# Patient Record
Sex: FEMALE | Race: WHITE | NOT HISPANIC OR LATINO | Employment: FULL TIME | URBAN - METROPOLITAN AREA
[De-identification: names, ages, dates, MRNs, and addresses within clinical notes are randomized per-mention and may not be internally consistent; named-entity substitution may affect disease eponyms.]

---

## 2023-08-09 ENCOUNTER — HOSPITAL ENCOUNTER (OUTPATIENT)
Dept: DATA CONVERSION | Facility: HOSPITAL | Age: 58
End: 2023-08-09
Attending: OBSTETRICS & GYNECOLOGY | Admitting: OBSTETRICS & GYNECOLOGY
Payer: COMMERCIAL

## 2023-08-09 DIAGNOSIS — N84.0 POLYP OF CORPUS UTERI: ICD-10-CM

## 2023-08-09 DIAGNOSIS — N95.0 POSTMENOPAUSAL BLEEDING: ICD-10-CM

## 2023-08-09 DIAGNOSIS — R93.89 ABNORMAL FINDINGS ON DIAGNOSTIC IMAGING OF OTHER SPECIFIED BODY STRUCTURES: ICD-10-CM

## 2023-08-15 LAB
COMPLETE PATHOLOGY REPORT: NORMAL
CONVERTED CLINICAL DIAGNOSIS-HISTORY: NORMAL
CONVERTED FINAL DIAGNOSIS: NORMAL
CONVERTED FINAL REPORT PDF LINK TO COPY AND PASTE: NORMAL
CONVERTED GROSS DESCRIPTION: NORMAL

## 2023-09-12 ENCOUNTER — OFFICE VISIT (OUTPATIENT)
Dept: PRIMARY CARE | Facility: CLINIC | Age: 58
End: 2023-09-12
Payer: COMMERCIAL

## 2023-09-12 VITALS
BODY MASS INDEX: 28 KG/M2 | DIASTOLIC BLOOD PRESSURE: 98 MMHG | SYSTOLIC BLOOD PRESSURE: 133 MMHG | WEIGHT: 163 LBS | RESPIRATION RATE: 12 BRPM | HEART RATE: 66 BPM

## 2023-09-12 DIAGNOSIS — E78.2 MIXED HYPERLIPIDEMIA: ICD-10-CM

## 2023-09-12 DIAGNOSIS — G62.9 NEUROPATHY: ICD-10-CM

## 2023-09-12 DIAGNOSIS — I10 BENIGN ESSENTIAL HYPERTENSION: Primary | ICD-10-CM

## 2023-09-12 DIAGNOSIS — K21.9 GASTROESOPHAGEAL REFLUX DISEASE WITHOUT ESOPHAGITIS: ICD-10-CM

## 2023-09-12 PROCEDURE — 3080F DIAST BP >= 90 MM HG: CPT | Performed by: INTERNAL MEDICINE

## 2023-09-12 PROCEDURE — 99214 OFFICE O/P EST MOD 30 MIN: CPT | Performed by: INTERNAL MEDICINE

## 2023-09-12 PROCEDURE — 3075F SYST BP GE 130 - 139MM HG: CPT | Performed by: INTERNAL MEDICINE

## 2023-09-12 RX ORDER — METOPROLOL SUCCINATE 50 MG/1
50 TABLET, EXTENDED RELEASE ORAL DAILY
COMMUNITY
End: 2023-09-12 | Stop reason: SDUPTHER

## 2023-09-12 RX ORDER — HYDROCHLOROTHIAZIDE 25 MG/1
25 TABLET ORAL DAILY
Qty: 90 TABLET | Refills: 1 | Status: SHIPPED | OUTPATIENT
Start: 2023-09-12 | End: 2024-03-02

## 2023-09-12 RX ORDER — METOPROLOL SUCCINATE 50 MG/1
50 TABLET, EXTENDED RELEASE ORAL DAILY
Qty: 90 TABLET | Refills: 1 | Status: SHIPPED | OUTPATIENT
Start: 2023-09-12 | End: 2024-02-15

## 2023-09-12 NOTE — PROGRESS NOTES
Subjective   Patient ID: Julieta Lagunas is a 57 y.o. female who presents for No chief complaint on file..    HPI follow-up visit no chest pain no shortness of breath no palpitations weight has been struggling with has been watching diet trying to exercise walks dog some stressors with her mother's recent hospitalization diastolic blood pressures have been elevated neuropathy discomfort is okay    Past medical history hypertension breast cancer hyperlipidemia gerd    Medication Neurontin 600 mg p.o. nightly metoprolol Vesicare tamoxifen    Allergies no known drug allergies    Social history no tobacco    Family history mother early Alzheimer's disease some arrhythmia has been 2 weeks in [facility]    Prevention walks dogs 4 miles per day [employer] some prior blood work reviewed           vital signs noted alert and oriented x3 NCAT    Review of Systems    Objective   There were no vitals taken for this visit.    Physical Exam color is good no lid lag no JVD no thyromegaly chest clear to auscultation CV regular rate and rhythm S1-S2 without murmur gallop rub or skip extremities no clubbing cyanosis or edema normal distal pulses    Assessment/Plan impression hypertension other diagnoses neuropathy weight diagnosis GERD hyperlipidemia carbohydrate diagnosis  Plan we will add hydrochlorothiazide 25 mg 1 p.o. daily fluid management refill metoprolol see EMR diet low in salt avoid nsaids ok for ppi or h2 blocker continue with regular exercise overall good water consumption overall weight loss inquired about other medications for weight loss we will hold on that at this time given that she is on tamoxifen and Neurontin which may be a portion of her weight then will recheck in 2 months for weight blood pressure and blood work for lipids and metabolism follow-up sooner as needed  tt40 cc21

## 2023-09-30 NOTE — H&P
History of Present Illness:   Pregnant/Lactating:  ·  Are You Pregnant no   ·  Are You Currently Breastfeeding no     History Present Illness:  Reason for surgery: PMB   HPI:    Here for hysteroscopy for PMB.      Allergies:        Allergies:  ·  Adhesive Bandages MISC : Unknown, [facility], [place]  ·  Tape  - Adhesive, Bandaids, Paper: Unknown    Home Medication Review:   Home Medications Reviewed: yes     Impression/Procedure:   ·  Impression and Planned Procedure: Hysteroscopy polypectomy       ERAS (Enhanced Recovery After Surgery):  ·  ERAS Patient: no     Review of Systems:   Review of Systems:  Constitutional: NEGATIVE: Fever, Chills, Anorexia,  Weight Loss, Malaise     Eyes: NEGATIVE: Blurry Vision, Drainage, Diploplia,  Redness, Vision Loss/ Change     ENMT: NEGATIVE: Nasal Discharge, Nasal Congestion,  Ear Pain, Mouth Pain, Throat Pain     Respiratory: NEGATIVE: Dry Cough, Productive Cough,  Hemoptysis, Wheezing, Shortness of Breath     Cardiac: NEGATIVE: Chest Pain, Dyspnea on Exertion,  Orthopnea, Palpitations, Syncope     Gastrointestinal: NEGATIVE: Nausea, Vomiting, Diarrhea,  Constipation, Abdominal Pain     Genitourinary: NEGATIVE: Discharge, Dysuria, Flank  Pain, Frequency, Hematuria     Musculoskeletal: NEGATIVE: Decreased ROM, Pain,  Swelling, Stiffness, Weakness     Neurological: NEGATIVE: Dizziness, Confusion, Headache,  Seizures, Syncope     Psychiatric: NEGATIVE: Mood Changes, Anxiety, Hallucinations,  Sleep Changes, Suicidal Ideas     Skin: NEGATIVE: Mass, Pain, Pruritus, Rash, Ulcer     Endocrine: NEGATIVE: Heat Intolerance, Cold Intolerance,  Sweat, Polyuria, Thirst     Hematologic/Lymph: NEGATIVE: Anemia, Bruising,  Easy Bleeding, Night Sweats, Petechiae     Allergic/Immunologic: NEGATIVE: Anaphylaxis, Itchy/  Teary Eyes, Itching, Sneezing, Swelling     Breast: NEGATIVE: Pain, Mass, Discharge, Nipple  Itching, Gynecomastia         Physical Exam by System:    Constitutional: Well developed,  awake/alert/oriented  x3, no distress, alert and cooperative   Respiratory/Thorax: Normal respiratory effort   Cardiovascular: RRR   Psychological: Appropriate mood and behavior     Consent:   COVID-19 Consent:  ·  COVID-19 Risk Consent Surgeon has reviewed key risks related to the risk of toño COVID-19 and if they contract COVID-19 what the risks are.       Electronic Signatures:  Bazella, Corinne (MD)  (Signed 06-Aug-2023 19:28)   Authored: History of Present Illness, Allergies, Home  Medication Review, Impression/Procedure, ERAS, Review of Systems, Physical Exam, Consent, Note Completion      Last Updated: 06-Aug-2023 19:28 by Bazella, Corinne (MD)

## 2023-10-01 NOTE — OP NOTE
Post Operative Note:     PreOp Diagnosis: Uterine Polyp   Post-Procedure Diagnosis: same   Procedure: 1. Hysteroscopy polypectomy, sampling  of endometrial tissue   Surgeon: Dave   Resident/Fellow/Other Assistant: none   Anesthesia: MAC   I.V. Fluids: 500   Estimated Blood Loss (mL): 5   Specimen: yes   Findings: large intrauterine polyp     Operative Report Dictated:  Dictation: not applicable - note contains Operative  Report   Operative Report:    The patient was taken to the operating room where anesthesia was administered. She was then positioned in the dorsal lithotomy position, and a bimanual exam was performed.  The patient was then prepped and draped in the normal sterile fashion. Next, a speculum was placed into the vagina, and the anterior lip of the cervix was grasped with a single-tooth tenaculum.  The cervix was dilated and the hysteroscope was then inserted  through the cervical canal and the interior of the uterus was examined. The fundus and bilateral ostia were seen as well as the polyp. The resection device was used and the polyp was removed using this device, a sample of the endometrium throughout the  uterus was also sampled using the device.  The hysteroscope was removed.    After the procedure, the single-toothed tenaculum was removed. The cervix and vagina were then examined and found to be hemostatic. The speculum was removed. All counts were correct.  The specimen was sent to pathology.      The patient was taken from the operating room in stable condition after reversal of anesthesia. She discharged home on the day of surgery.      Attestation:   Note Completion:  Attending Attestation I performed the procedure without a resident         Electronic Signatures:  Bazella, Corinne (MD)  (Signed 09-Aug-2023 12:56)   Authored: Post Operative Note, Note Completion      Last Updated: 09-Aug-2023 12:56 by Bazella, Corinne (MD)

## 2023-10-31 ENCOUNTER — PHARMACY VISIT (OUTPATIENT)
Dept: PHARMACY | Facility: CLINIC | Age: 58
End: 2023-10-31
Payer: COMMERCIAL

## 2023-10-31 PROCEDURE — RXMED WILLOW AMBULATORY MEDICATION CHARGE

## 2023-10-31 RX ORDER — GABAPENTIN 300 MG/1
300 CAPSULE ORAL 2 TIMES DAILY
Qty: 720 CAPSULE | Refills: 0 | Status: CANCELLED | OUTPATIENT
Start: 2023-10-31 | End: 2024-10-29

## 2023-11-17 ENCOUNTER — OFFICE VISIT (OUTPATIENT)
Dept: PRIMARY CARE | Facility: CLINIC | Age: 58
End: 2023-11-17
Payer: COMMERCIAL

## 2023-11-17 VITALS — BODY MASS INDEX: 28.15 KG/M2 | WEIGHT: 164 LBS | DIASTOLIC BLOOD PRESSURE: 68 MMHG | SYSTOLIC BLOOD PRESSURE: 121 MMHG

## 2023-11-17 DIAGNOSIS — E78.2 MIXED HYPERLIPIDEMIA: ICD-10-CM

## 2023-11-17 DIAGNOSIS — G47.9 SLEEP DISTURBANCE: ICD-10-CM

## 2023-11-17 DIAGNOSIS — G47.10 HYPERSOMNIA, UNSPECIFIED: ICD-10-CM

## 2023-11-17 DIAGNOSIS — I10 BENIGN ESSENTIAL HYPERTENSION: ICD-10-CM

## 2023-11-17 DIAGNOSIS — R06.83 SNORING: ICD-10-CM

## 2023-11-17 DIAGNOSIS — Z00.00 HEALTH CARE MAINTENANCE: Primary | ICD-10-CM

## 2023-11-17 PROCEDURE — 99213 OFFICE O/P EST LOW 20 MIN: CPT | Performed by: INTERNAL MEDICINE

## 2023-11-17 PROCEDURE — 3074F SYST BP LT 130 MM HG: CPT | Performed by: INTERNAL MEDICINE

## 2023-11-17 PROCEDURE — 3078F DIAST BP <80 MM HG: CPT | Performed by: INTERNAL MEDICINE

## 2023-11-17 NOTE — PROGRESS NOTES
Subjective   Patient ID: Julieta Lagunas is a 58 y.o. female who presents for No chief complaint on file..    HPI follow-up visit no chest pain no shortness of breath no side effect with medication seems to be doing better overall but still with low energy metabolism    Review of Systems    Objective   There were no vitals taken for this visit.    Physical Exam vital signs noted alert and oriented x3 NCAT OP benign no JVD chest clear to auscultation CV regular rate and rhythm S1-S2 extremities no clubbing cyanosis or edema normal distal pulses    Assessment/Plan impression hypertension rule out sleep apnea hyperlipidemia  Plan would like coronary artery calcium CT score requisition made and advised on future lipids and medication  Would like home sleep study risk-benefit side effects reviewed with her and wishes to proceed given insufficient sleep  Continue with blood pressure medicine appears to be helpful  Diet exercise weight loss  Review prior laboratory results (lipids modestly elevated)  Recheck after testing

## 2023-11-21 ENCOUNTER — PHARMACY VISIT (OUTPATIENT)
Dept: PHARMACY | Facility: CLINIC | Age: 58
End: 2023-11-21
Payer: COMMERCIAL

## 2024-01-16 PROCEDURE — RXMED WILLOW AMBULATORY MEDICATION CHARGE

## 2024-01-17 ENCOUNTER — PHARMACY VISIT (OUTPATIENT)
Dept: PHARMACY | Facility: CLINIC | Age: 59
End: 2024-01-17
Payer: COMMERCIAL

## 2024-01-29 PROCEDURE — RXMED WILLOW AMBULATORY MEDICATION CHARGE

## 2024-01-31 ENCOUNTER — PHARMACY VISIT (OUTPATIENT)
Dept: PHARMACY | Facility: CLINIC | Age: 59
End: 2024-01-31
Payer: COMMERCIAL

## 2024-02-13 ENCOUNTER — APPOINTMENT (OUTPATIENT)
Dept: DERMATOLOGY | Facility: CLINIC | Age: 59
End: 2024-02-13
Payer: COMMERCIAL

## 2024-02-14 DIAGNOSIS — I10 BENIGN ESSENTIAL HYPERTENSION: ICD-10-CM

## 2024-02-15 RX ORDER — METOPROLOL SUCCINATE 50 MG/1
50 TABLET, EXTENDED RELEASE ORAL DAILY
Qty: 90 TABLET | Refills: 1 | Status: SHIPPED | OUTPATIENT
Start: 2024-02-15

## 2024-02-28 DIAGNOSIS — I10 BENIGN ESSENTIAL HYPERTENSION: ICD-10-CM

## 2024-03-02 RX ORDER — HYDROCHLOROTHIAZIDE 25 MG/1
25 TABLET ORAL DAILY
Qty: 90 TABLET | Refills: 1 | Status: SHIPPED | OUTPATIENT
Start: 2024-03-02

## 2024-03-25 ENCOUNTER — CLINICAL SUPPORT (OUTPATIENT)
Dept: SLEEP MEDICINE | Facility: HOSPITAL | Age: 59
End: 2024-03-25
Payer: COMMERCIAL

## 2024-03-25 DIAGNOSIS — G47.10 HYPERSOMNIA, UNSPECIFIED: ICD-10-CM

## 2024-03-25 DIAGNOSIS — R06.83 SNORING: ICD-10-CM

## 2024-03-25 DIAGNOSIS — G47.9 SLEEP DISTURBANCE: ICD-10-CM

## 2024-03-25 PROCEDURE — 95806 SLEEP STUDY UNATT&RESP EFFT: CPT | Performed by: INTERNAL MEDICINE

## 2024-04-09 PROCEDURE — RXMED WILLOW AMBULATORY MEDICATION CHARGE

## 2024-04-10 ENCOUNTER — PHARMACY VISIT (OUTPATIENT)
Dept: PHARMACY | Facility: CLINIC | Age: 59
End: 2024-04-10
Payer: COMMERCIAL

## 2024-04-25 ENCOUNTER — APPOINTMENT (OUTPATIENT)
Dept: SLEEP MEDICINE | Facility: HOSPITAL | Age: 59
End: 2024-04-25
Payer: COMMERCIAL

## 2024-04-25 NOTE — PROGRESS NOTES
Subjective   Patient ID: Julieta Lagunas is a 58 y.o. female who presents for No chief complaint on file..  [facility]  58 y.o.  patient presenting as a referral from Dr. Tamayo with complaints of urge urinary incontinence    Problems on going for the last 2 years. Her biggest complaint today is that urgency and frequency that worsens over the course of the day.  She notes urinary incontinence and significant daytime frequency.  She notes 2 episodes of nocturia.  She denies a history of nephrolithiasis, chronic urinary tract infections, or any gross hematuria.  She does note nightly enuresis.    She has been evaluated previously and prescribed solifenacin that did not work. She explains that it does not seem to matter what she drinks. She notices that when she starts drinking water in the afternoon she cannot make it to the bathroom. In the evenings she notices that she will go 3-4 times between 6-11pm. She notes 2x nocturia, and does have enuresis. She changes her pad twice during the day.     She otherwise denies any vaginal complaints.  She is sexually active and denies any vaginal dryness, prolapse concerns, or any abnormal discharge.    Hx breast cancer, bilateral mastectomy currently on medication  Previous smoker, now vapes.     She denies any bowel related complaints.    Review of Systems  Constitutional: No fever, No chills and No fatigue.   Eyes: No vision problems and No dryness of the eyes.   ENT: No dry mouth, No hearing loss and No nosebleeds.   Cardiovascular: No chest pain, No palpitations and No orthopnea.   Respiratory: No shortness of breath, No cough and No wheezing.   Gastrointestinal: No abdominal pain, No constipation, No nausea, No diarrhea, No vomiting and No melena.   Genitourinary: As noted in HPI.   Musculoskeletal: No back pain, No myalgias, No muscle weakness, No joint swelling and No leg edema.   Integumentary: No rashes, No skin lesion and No itching.   Neurological: No headache, No numbness  and No dizziness.   Psychiatric: No sleep disturbances, No anxiety and No depression.   Endocrine: No hot flashes, No loss of hair and No hirsutism.   Hematologic/Lymphatic: No swollen glands, No tendency for easy bleeding and No tendency for easy bruising.   All other systems have been reviewed and are negative for complaint.        Objective   Physical Exam  PHYSICAL EXAMINATION:  No LMP recorded.  There is no height or weight on file to calculate BMI.  There were no vitals taken for this visit.  General Appearance: well appearing  Neuro: Alert and oriented   HEENT: mucous membranes moist, neck supple  Resp: No respiratory distress, normal work of breathing  MSK: normal range of motion, gait appropriate    Assessment/Plan     58-year-old with urge urinary incontinence and history of breast cancer.    We discussed beta-3 agonist medications today as she did not have benefits from Solifenacin previously. We discussed the risks and benefits. We also talked about the fact that this can take 3-4 works to begin working. We sent her Myrbetriq prescription to her listed pharmacy.    We discussed botox vs sacral neuromodulation: both have similar efficacy 80% patients reports >50% improvement, botox associated with 5% risk of incomplete emptying, increase in UTI and will require re-injection in 6-9 months; and as early as 3 months. SNM is a staged procedure, 2 weeks apart, consisting first of lead implantation then internalization of IPG if there is improvement. Interstim is associated with lead migration, explantation, infection and bleeding, though risks are all <5%. We also discussed PTNS which is associated with success rates comparable to medical therapy but without side-effects without significant major morbidity.  We also discussed the Revi system including its risks and benefits.  The patient would prefer to exhaust all medication options before considering any further procedures.     The patient will follow up in  4 weeks to discuss her lower urinary tract symptoms.     JAYASHREE Garrett MD    Scribe Attestation  By signing my name below, I, Charley Abena Han attest that this documentation has been prepared under the direction and in the presence of Dominick Garrett MD. All medical record entries made by the Scribe were at my direction or personally dictated by me. I have reviewed the chart and agree that the record accurately reflects my personal performance of the history, physical exam, discussion and plan.

## 2024-04-29 PROCEDURE — RXMED WILLOW AMBULATORY MEDICATION CHARGE

## 2024-05-02 ENCOUNTER — PHARMACY VISIT (OUTPATIENT)
Dept: PHARMACY | Facility: CLINIC | Age: 59
End: 2024-05-02
Payer: COMMERCIAL

## 2024-05-03 ENCOUNTER — OFFICE VISIT (OUTPATIENT)
Dept: UROLOGY | Facility: CLINIC | Age: 59
End: 2024-05-03
Payer: COMMERCIAL

## 2024-05-03 VITALS — SYSTOLIC BLOOD PRESSURE: 118 MMHG | HEART RATE: 64 BPM | DIASTOLIC BLOOD PRESSURE: 78 MMHG

## 2024-05-03 DIAGNOSIS — R35.0 FREQUENT URINATION: ICD-10-CM

## 2024-05-03 DIAGNOSIS — N39.41 URGE INCONTINENCE: Primary | ICD-10-CM

## 2024-05-03 DIAGNOSIS — F17.200 SMOKER: ICD-10-CM

## 2024-05-03 LAB
POC APPEARANCE, URINE: ABNORMAL
POC BILIRUBIN, URINE: NEGATIVE
POC BLOOD, URINE: ABNORMAL
POC COLOR, URINE: YELLOW
POC GLUCOSE, URINE: NEGATIVE MG/DL
POC KETONES, URINE: NEGATIVE MG/DL
POC LEUKOCYTES, URINE: ABNORMAL
POC NITRITE,URINE: NEGATIVE
POC PH, URINE: 6 PH
POC PROTEIN, URINE: ABNORMAL MG/DL
POC SPECIFIC GRAVITY, URINE: 1.02
POC UROBILINOGEN, URINE: 0.2 EU/DL

## 2024-05-03 PROCEDURE — 99214 OFFICE O/P EST MOD 30 MIN: CPT | Performed by: OBSTETRICS & GYNECOLOGY

## 2024-05-03 PROCEDURE — 81003 URINALYSIS AUTO W/O SCOPE: CPT | Performed by: OBSTETRICS & GYNECOLOGY

## 2024-05-03 PROCEDURE — 99204 OFFICE O/P NEW MOD 45 MIN: CPT | Performed by: OBSTETRICS & GYNECOLOGY

## 2024-05-03 RX ORDER — MIRABEGRON 50 MG/1
50 TABLET, EXTENDED RELEASE ORAL DAILY
Qty: 30 TABLET | Refills: 11 | Status: SHIPPED | OUTPATIENT
Start: 2024-05-03 | End: 2024-05-09

## 2024-05-03 NOTE — PATIENT INSTRUCTIONS
Please start your Myrbetriq medication for your bladder at your earliest convenience.  Should this be cost prohibitive or you have any issues with this please stop it immediately and contact the clinic.    Please follow-up in 4 to 6 weeks virtually to discuss her lower urinary tract complaints.    [phone]

## 2024-05-09 DIAGNOSIS — N39.41 URGE INCONTINENCE: Primary | ICD-10-CM

## 2024-05-09 DIAGNOSIS — N39.41 URGE INCONTINENCE: ICD-10-CM

## 2024-05-09 RX ORDER — TOLTERODINE 2 MG/1
2 CAPSULE, EXTENDED RELEASE ORAL DAILY
Qty: 30 CAPSULE | Refills: 11 | Status: SHIPPED | OUTPATIENT
Start: 2024-05-09 | End: 2024-05-10

## 2024-05-10 RX ORDER — TRIAMCINOLONE ACETONIDE 1 MG/G
CREAM TOPICAL
COMMUNITY
Start: 2019-05-07

## 2024-05-10 RX ORDER — LIDOCAINE HYDROCHLORIDE 20 MG/ML
SOLUTION OROPHARYNGEAL
COMMUNITY
Start: 2021-10-11

## 2024-05-10 RX ORDER — RAMIPRIL 1.25 MG/1
1.25 CAPSULE ORAL
COMMUNITY

## 2024-05-10 RX ORDER — LOSARTAN POTASSIUM 25 MG/1
TABLET ORAL
COMMUNITY
Start: 2019-10-23

## 2024-05-10 RX ORDER — LETROZOLE 2.5 MG/1
2.5 TABLET, FILM COATED ORAL
COMMUNITY

## 2024-05-10 RX ORDER — ASPIRIN 81 MG/1
TABLET ORAL
COMMUNITY
Start: 2019-06-28

## 2024-05-10 RX ORDER — MULTIVITAMIN WITH IRON
TABLET ORAL
COMMUNITY

## 2024-05-10 RX ORDER — ZOLPIDEM TARTRATE 10 MG/1
TABLET ORAL
COMMUNITY
Start: 2016-05-01

## 2024-05-10 RX ORDER — TOLTERODINE 2 MG/1
CAPSULE, EXTENDED RELEASE ORAL
Qty: 90 CAPSULE | Refills: 3 | Status: SHIPPED | OUTPATIENT
Start: 2024-05-10

## 2024-05-10 RX ORDER — PSYLLIUM HUSK 0.4 G
CAPSULE ORAL
COMMUNITY
Start: 2014-01-21

## 2024-05-10 RX ORDER — OMEPRAZOLE 20 MG/1
1 CAPSULE, DELAYED RELEASE ORAL 2 TIMES DAILY
COMMUNITY
Start: 2013-05-03

## 2024-05-16 ENCOUNTER — HOSPITAL ENCOUNTER (OUTPATIENT)
Dept: RADIOLOGY | Facility: HOSPITAL | Age: 59
Discharge: HOME | End: 2024-05-16
Payer: COMMERCIAL

## 2024-05-16 DIAGNOSIS — E78.2 MIXED HYPERLIPIDEMIA: ICD-10-CM

## 2024-05-16 DIAGNOSIS — I10 BENIGN ESSENTIAL HYPERTENSION: ICD-10-CM

## 2024-05-16 PROCEDURE — 75571 CT HRT W/O DYE W/CA TEST: CPT

## 2024-06-05 NOTE — PROGRESS NOTES
Subjective   Patient ID: Julieta Lagunas is a 58 y.o. female who presents for follow up.    Today's visit was done virtually after appropriate consent from the patient. Virtual or Telephone Consent     An interactive audio  telecommunication system which permits real time communications between the patient at home and provider (at the distant site) was utilized to provide this telehealth service. Greater than 10 minutes were spent discussing the patients concerns and coordinating care.     HPI  This visit was performed through telemedicine  58-year-old with urge urinary incontinence and history of breast cancer.    The patient appears to be having excellent results with the Detrol. She denies any dry mouth or constipation complaints with this medication. She denies any UTI like symptoms.    She denies any vaginal complaints, no abnormal bleeding or discharge.     She denies any bowel related complaints, no fecal or flatal incontinence.    She has no other complaints.      From Previous note  58 y.o.  patient presenting as a referral from Dr. Tamayo with complaints of urge urinary incontinence    Problems on going for the last 2 years. Her biggest complaint today is that urgency and frequency that worsens over the course of the day.  She notes urinary incontinence and significant daytime frequency.  She notes 2 episodes of nocturia.  She denies a history of nephrolithiasis, chronic urinary tract infections, or any gross hematuria.  She does note nightly enuresis.    She has been evaluated previously and prescribed solifenacin that did not work. She explains that it does not seem to matter what she drinks. She notices that when she starts drinking water in the afternoon she cannot make it to the bathroom. In the evenings she notices that she will go 3-4 times between 6-11pm. She notes 2x nocturia, and does have enuresis. She changes her pad twice during the day.     She otherwise denies any vaginal complaints.  She is  sexually active and denies any vaginal dryness, prolapse concerns, or any abnormal discharge.    Hx breast cancer, bilateral mastectomy currently on medication  Previous smoker, now vapes.     She denies any bowel related complaints.    Review of Systems  Constitutional: No fever, No chills and No fatigue.   Eyes: No vision problems and No dryness of the eyes.   ENT: No dry mouth, No hearing loss and No nosebleeds.   Cardiovascular: No chest pain, No palpitations and No orthopnea.   Respiratory: No shortness of breath, No cough and No wheezing.   Gastrointestinal: No abdominal pain, No constipation, No nausea, No diarrhea, No vomiting and No melena.   Genitourinary: As noted in HPI.   Musculoskeletal: No back pain, No myalgias, No muscle weakness, No joint swelling and No leg edema.   Integumentary: No rashes, No skin lesion and No itching.   Neurological: No headache, No numbness and No dizziness.   Psychiatric: No sleep disturbances, No anxiety and No depression.   Endocrine: No hot flashes, No loss of hair and No hirsutism.   Hematologic/Lymphatic: No swollen glands, No tendency for easy bleeding and No tendency for easy bruising.   All other systems have been reviewed and are negative for complaint.        Objective   Physical Exam  This visit was performed through telemedicine     Assessment/Plan     58-year-old with urge urinary incontinence and history of breast cancer.    We again discussed the patient's urge urinary incontinence complaints.  She has had near complete resolution of these concerns utilizing her tolterodine 2 mg daily.  She wishes to have this represcribed she will [facility] order pharmacy but is not sure what this is.  She will contact the clinic in order to update this information and we will send a 90-day supply with a year of refills.    The patient will follow up yearly moving forward    JAYASHREE Garrett MD    Scribe Attestation  By signing my name below, Charley HENLEY, Abena attest  that this documentation has been prepared under the direction and in the presence of Dominick Garrett MD. All medical record entries made by the Scribe were at my direction or personally dictated by me. I have reviewed the chart and agree that the record accurately reflects my personal performance of the history, physical exam, discussion and plan.

## 2024-06-06 ENCOUNTER — TELEMEDICINE (OUTPATIENT)
Dept: UROLOGY | Facility: CLINIC | Age: 59
End: 2024-06-06
Payer: COMMERCIAL

## 2024-06-06 DIAGNOSIS — R35.0 FREQUENT URINATION: ICD-10-CM

## 2024-06-06 DIAGNOSIS — N39.41 URGE INCONTINENCE: Primary | ICD-10-CM

## 2024-06-06 DIAGNOSIS — F17.200 SMOKER: ICD-10-CM

## 2024-06-06 PROCEDURE — 99442 PR PHYS/QHP TELEPHONE EVALUATION 11-20 MIN: CPT | Performed by: OBSTETRICS & GYNECOLOGY

## 2024-07-08 PROCEDURE — RXMED WILLOW AMBULATORY MEDICATION CHARGE

## 2024-07-11 ENCOUNTER — PHARMACY VISIT (OUTPATIENT)
Dept: PHARMACY | Facility: CLINIC | Age: 59
End: 2024-07-11
Payer: COMMERCIAL

## 2024-07-24 ENCOUNTER — APPOINTMENT (OUTPATIENT)
Dept: PRIMARY CARE | Facility: CLINIC | Age: 59
End: 2024-07-24
Payer: COMMERCIAL

## 2024-07-24 VITALS
DIASTOLIC BLOOD PRESSURE: 74 MMHG | HEART RATE: 69 BPM | SYSTOLIC BLOOD PRESSURE: 123 MMHG | RESPIRATION RATE: 12 BRPM | OXYGEN SATURATION: 99 %

## 2024-07-24 DIAGNOSIS — I10 BENIGN ESSENTIAL HYPERTENSION: Primary | ICD-10-CM

## 2024-07-24 DIAGNOSIS — I47.10 SVT (SUPRAVENTRICULAR TACHYCARDIA) (CMS-HCC): ICD-10-CM

## 2024-07-24 PROCEDURE — 99213 OFFICE O/P EST LOW 20 MIN: CPT | Performed by: INTERNAL MEDICINE

## 2024-07-24 PROCEDURE — 3074F SYST BP LT 130 MM HG: CPT | Performed by: INTERNAL MEDICINE

## 2024-07-24 PROCEDURE — 3078F DIAST BP <80 MM HG: CPT | Performed by: INTERNAL MEDICINE

## 2024-07-24 NOTE — PROGRESS NOTES
Subjective   Patient ID: Julieta Lagunas is a 58 y.o. female who presents for No chief complaint on file..    HPI follow-up visit and sick visit no chest pain no shortness of breath no palpitation but noticed that her heart monitoring watch sometimes showed beats per minute greater than 200 lasting sometimes for 5 minutes she was unaware has had no change in stamina or exercise or medication she vapes not much in the way of caffeine also reviewed her sleep apnea study which was basically negative and her CT coronary score which was low and there was a small nodule there with history of breast cancer we will repeat a dedicated lung scan in the future EXTR medications at night    Review of Systems    Objective   There were no vitals taken for this visit.    Physical Exam vital signs noted alert and oriented x 3 NCAT no JVD or bruit no lid lag no proptosis no thyromegaly chest good auscultation CV regular rate and rhythm S1-S2 without murmur gallop or rub or skip extremities no clubbing cyanosis or edema normal distal pulses DTR 2+ no tremor    Assessment/Plan    impression supraventricular tachycardia?  Hypertension lung nodule?  Other diagnoses  Plan continue with your breast cancer surveillance  Good diet regular exercise good water consumption continue with blood pressure medication and set up chest CT for the fall set up Holter monitor or Zio patch to evaluate heart rhythm then recheck for regular examination and blood work and based on above  Sleep hygiene  Nuclear CT scan and Zio patch

## 2024-07-26 PROCEDURE — RXMED WILLOW AMBULATORY MEDICATION CHARGE

## 2024-07-30 ENCOUNTER — PHARMACY VISIT (OUTPATIENT)
Dept: PHARMACY | Facility: CLINIC | Age: 59
End: 2024-07-30
Payer: COMMERCIAL

## 2024-08-28 ENCOUNTER — APPOINTMENT (OUTPATIENT)
Dept: DERMATOLOGY | Facility: CLINIC | Age: 59
End: 2024-08-28
Payer: COMMERCIAL

## 2024-08-28 DIAGNOSIS — D48.5 NEOPLASM OF UNCERTAIN BEHAVIOR OF SKIN: ICD-10-CM

## 2024-08-28 DIAGNOSIS — L82.1 SEBORRHEIC KERATOSIS: ICD-10-CM

## 2024-08-28 DIAGNOSIS — D22.9 MULTIPLE BENIGN NEVI: Primary | ICD-10-CM

## 2024-08-28 DIAGNOSIS — Z12.83 SCREENING EXAM FOR SKIN CANCER: ICD-10-CM

## 2024-08-28 DIAGNOSIS — L57.8 ACTINIC SKIN DAMAGE: ICD-10-CM

## 2024-08-28 DIAGNOSIS — I10 BENIGN ESSENTIAL HYPERTENSION: ICD-10-CM

## 2024-08-28 DIAGNOSIS — Z92.3 HISTORY OF RADIATION EXPOSURE: ICD-10-CM
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
## 2024-08-28 DIAGNOSIS — L81.4 LENTIGO: ICD-10-CM

## 2024-08-28 PROCEDURE — 11301 SHAVE SKIN LESION 0.6-1.0 CM: CPT | Performed by: DERMATOLOGY

## 2024-08-28 PROCEDURE — 99213 OFFICE O/P EST LOW 20 MIN: CPT | Performed by: DERMATOLOGY

## 2024-08-28 RX ORDER — HYDROCHLOROTHIAZIDE 25 MG/1
25 TABLET ORAL DAILY
Qty: 90 TABLET | Refills: 1 | Status: SHIPPED | OUTPATIENT
Start: 2024-08-28

## 2024-08-28 RX ORDER — CLOBETASOL PROPIONATE 0.5 MG/G
OINTMENT TOPICAL 2 TIMES DAILY
Qty: 30 G | Refills: 1 | Status: SHIPPED | OUTPATIENT
Start: 2024-08-28

## 2024-08-28 RX ORDER — METOPROLOL SUCCINATE 50 MG/1
50 TABLET, EXTENDED RELEASE ORAL DAILY
Qty: 90 TABLET | Refills: 1 | Status: SHIPPED | OUTPATIENT
Start: 2024-08-28

## 2024-08-28 ASSESSMENT — DERMATOLOGY QUALITY OF LIFE (QOL) ASSESSMENT
ARE THERE EXCLUSIONS OR EXCEPTIONS FOR THE QUALITY OF LIFE ASSESSMENT: NO
RATE HOW BOTHERED YOU ARE BY EFFECTS OF YOUR SKIN PROBLEMS ON YOUR ACTIVITIES (EG, GOING OUT, ACCOMPLISHING WHAT YOU WANT, WORK ACTIVITIES OR YOUR RELATIONSHIPS WITH OTHERS): 0 - NEVER BOTHERED
RATE HOW BOTHERED YOU ARE BY SYMPTOMS OF YOUR SKIN PROBLEM (EG, ITCHING, STINGING BURNING, HURTING OR SKIN IRRITATION): 0 - NEVER BOTHERED
DATE THE QUALITY-OF-LIFE ASSESSMENT WAS COMPLETED: 67080
RATE HOW EMOTIONALLY BOTHERED YOU ARE BY YOUR SKIN PROBLEM (FOR EXAMPLE, WORRY, EMBARRASSMENT, FRUSTRATION): 0 - NEVER BOTHERED

## 2024-08-28 ASSESSMENT — ITCH NUMERIC RATING SCALE: HOW SEVERE IS YOUR ITCHING?: 0

## 2024-08-28 ASSESSMENT — DERMATOLOGY PATIENT ASSESSMENT
ARE YOU AN ORGAN TRANSPLANT RECIPIENT: NO
DO YOU USE SUNSCREEN: OCCASIONALLY
ARE YOU TRYING TO GET PREGNANT: NO
ARE YOU ON BIRTH CONTROL: NO
DO YOU HAVE IRREGULAR MENSTRUAL CYCLES: NO
HAVE YOU HAD OR DO YOU HAVE VASCULAR DISEASE: NO
DO YOU USE A TANNING BED: NO
DO YOU HAVE ANY NEW OR CHANGING LESIONS: NO
HAVE YOU HAD OR DO YOU HAVE A STAPH INFECTION: NO

## 2024-08-28 ASSESSMENT — PATIENT GLOBAL ASSESSMENT (PGA): PATIENT GLOBAL ASSESSMENT: PATIENT GLOBAL ASSESSMENT:  1 - CLEAR

## 2024-08-28 NOTE — PROGRESS NOTES
Subjective     Julieta Lagunas is a 58 y.o. female who presents for the following: Skin Check (Rt flank).     Last derm visit 12/21/2020 for Full Skin Exam - no lesions of concern        Review of Systems:  No other skin or systemic complaints other than what is documented elsewhere in the note.    The following portions of the chart were reviewed this encounter and updated as appropriate:  Tobacco  Allergies  Meds  Problems  Med Hx  Surg Hx         Skin Cancer History  No skin cancer on file.      Specialty Problems    None       Objective   Well appearing patient in no apparent distress; mood and affect are within normal limits.    A full examination was performed including scalp, head, eyes, ears, nose, lips, neck, chest, axillae, abdomen, back, buttocks, bilateral upper extremities, bilateral lower extremities, hands, feet, fingers, toes, fingernails, and toenails. All findings within normal limits unless otherwise noted below.    Assessment/Plan   1. Multiple benign nevi  Brown and tan macules and papules with reassuring findings on dermoscopy    -These lesions have benign, reassuring patterns on dermoscopy  -Recommend continued self observation, and to contact the office if any changes in nevi are noticed    2. Lentigo  Tan macules    -Benign appearing on exam  -Reassurance, recommend observation    3. Seborrheic keratosis (2)  Generalized, Right Flank  Stuck on, waxy macule(s)/papule(s)/plaque(s) with comedo-like openings and milia like cysts    Treat one on right flank with liquid nitrogen as a test spot    -Discussed the nature of the diagnosis  -Reassurance, recommend continued observation    4. Actinic skin damage  Background of photodamage with hyper- and hypo-pigmented macules on the skin    5. 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 Screening exam for skin cancer  As  part of a routine Full Skin Exam, a genital examination with the presence of a chaperone was offered. The patient declined  the exam and declined the chaperone. Follows with gyne      Full body skin exam  -No lesions concerning for malignancy on the remainder the skin exam today   - The ugly duckling sign was discussed. Monitor for any skin lesions that are different in color, shape, or size than others on body  -Sun protection was discussed. Recommend SPF 30+, hats with brims, sun protective clothing, and avoiding sun exposure between 10 AM and 2 PM whenever possible  -Recommend regular skin exams or sooner if new or changing lesions       Related Procedures  Follow Up In Dermatology - Established Patient    7. Neoplasm of uncertain behavior of skin  Right Lower Leg - Anterior  0.8 x 0.6 cm pink-brown firm papule with positive dimple sign, causing pain with shaving and bleeding          Shave removal    Lesion length (cm):  0.6  Lesion width (cm):  0.8  Margin per side (cm):  0.1  Lesion diameter (cm):  1  Informed consent: discussed and consent obtained    Timeout: patient name, date of birth, surgical site, and procedure verified    Procedure prep:  Patient was prepped and draped  Anesthesia: the lesion was anesthetized in a standard fashion    Anesthetic:  1% lidocaine w/ epinephrine 1-100,000 local infiltration  Instrument used: DermaBlade    Hemostasis achieved with: aluminum chloride    Outcome: patient tolerated procedure well    Post-procedure details: sterile dressing applied and wound care instructions given    Dressing type: bandage and petrolatum      Staff Communication: Dermatology Local Anesthesia: Lidocaine 0.5% with Epinephrine 1;200,000 - Amount: 3 ml    Specimen 1 - Dermatopathology- DERM LAB  Differential Diagnosis: dermatofibroma  Check Margins Yes/No?:    Comments:    Dermpath Lab: Routine Histopathology (formalin-fixed tissue)    8. Bitten or stung by nonvenomous insect and other nonvenomous  arthropods, initial encounter  Fading pink patch    Exuberant reaction to bee sting    Using OTC hydrocortisone    Rx clobetasol today    clobetasol (Temovate) 0.05 % ointment  Apply topically 2 times a day. For up to 2 weeks to areas of bee sting reaction on arms, legs, body        Follow up 1 year Full Skin Exam or sooner as needed

## 2024-08-30 LAB
LABORATORY COMMENT REPORT: NORMAL
PATH REPORT.FINAL DX SPEC: NORMAL
PATH REPORT.GROSS SPEC: NORMAL
PATH REPORT.MICROSCOPIC SPEC OTHER STN: NORMAL
PATH REPORT.RELEVANT HX SPEC: NORMAL
PATH REPORT.TOTAL CANCER: NORMAL

## 2024-09-15 PROBLEM — Z08 ENCOUNTER FOR FOLLOW-UP SURVEILLANCE OF BREAST CANCER: Status: ACTIVE | Noted: 2024-09-15

## 2024-09-15 PROBLEM — Z85.3 HISTORY OF MALIGNANT NEOPLASM OF BOTH BREASTS: Status: ACTIVE | Noted: 2024-09-15

## 2024-09-15 PROBLEM — Z85.3 ENCOUNTER FOR FOLLOW-UP SURVEILLANCE OF BREAST CANCER: Status: ACTIVE | Noted: 2024-09-15

## 2024-09-15 NOTE — PROGRESS NOTES
+Patient ID: Julieta Lagunas is a 59 y.o. female.  BREAST CANCER DIAGNOSIS:    Breast Cancer         AJCC Edition: 7th, Diagnosis Date: 11-Dec-2012, Stage IA, T1b pN0 M0          Breast Cancer         AJCC Edition: 7th, Diagnosis Date: 30-Oct-2012, Stage IIIA, T3 N1 M0 G2     Treatment Synopsis:    1. Clinical T1b N0 M0, stage IA invasive ductal carcinoma of the right breast, status post neoadjuvant dose-dense AC chemotherapy x4, followed by  Taxol x4. This was followed by a right mastectomy with tissue expander reconstruction (May 2013). She achieved a pathologic complete response to neoadjuvant chemotherapy.   2. Clinical T2 N1 M0 stage IIB invasive ductal carcinoma of the left breast, status post neoadjuvant dose-dense AC chemotherapy x4, followed by Taxol x4, followed by a left mastectomy with sentinel lymph node biopsy (surgery May 2013). There was 2.1 cm  residual tumor with 4 out of 28 positive lymph nodes.   3. Following surgery, she received radiation therapy to the left supraclavicular fossa, left axilla, and left upper chest wall consisting of a dose of 50 Gy completed on August 5, 2013.   4. Started tamoxifen June 2014, but then switched to Lupron (1st shot 2/27/15) and Letrozole (letrozole prescribed 3/2015). Last lupron 1/2020 and completed 5 yrs of therapy.   5. June 2013: Revision of the breast with excision of the mastectomy flap skin necrosis and irrigation of the pocket and closure.   6. Feb 2014: Bilateral second-stage reconstruction with removal of tissue expanders and placement of silicone gel implants, extensive capsulotomy, and reshaping of the pocket.   7. Nov 2014: Removal of left breast implant, excision of ulcerated skin, and closure over drain.   8. Dec. 2014: OPERATION/PROCEDURE: 1. Exploration debridement and excisional fashion of skin with subcutaneous tissue and fascia. 2. Irrigation, placement of drain, and complex closure of 5 cm of incision.   9. 5/1/15: Delayed reconstruction of  the left breast with deep inferior epigastric  flap. 2. Extensive capsulotomy and resection of portion of radiated skin, subcutaneous tissue, and fascia on the left side  2020 transition to  tamoxifen.       Past Medical History: Julieta has a past medical history of Anxiety disorder, unspecified, Cutaneous abscess of left lower limb (2016), Displaced fracture of cuboid bone of right foot, initial encounter for closed fracture (2019), Encounter for antineoplastic chemotherapy, Encounter for gynecological examination (general) (routine) without abnormal findings (2016), Encounter for screening for malignant neoplasm of cervix (2016), Other acute postprocedural pain (2014), Other conditions influencing health status (05/15/2015), Other conditions influencing health status, Personal history of malignant neoplasm of breast, Personal history of malignant neoplasm of breast (10/27/2016), Personal history of other diseases of the digestive system (2017), Personal history of other mental and behavioral disorders (10/23/2019), Radiodermatitis, unspecified (10/05/2020), and Unspecified abdominal hernia without obstruction or gangrene (2015).  Surgical History:  Julieta has a past surgical history that includes Dilation and curettage of uterus (2013); Mastectomy (2013); Other surgical history (2013); Other surgical history (2013); Other surgical history (2014); Other surgical history (2014); Other surgical history (2015); Other surgical history (2014); and Other surgical history (2014).  Social History:  Julieta reports that she has been smoking cigarettes. She has never been exposed to tobacco smoke. She has never used smokeless tobacco.     works at  as manager for patient access  per 2021 visit: youngest son is in college at [employer] and her oldest son is working on his Master's at [employer]       Family  History:  No family history on file.  Family Oncology History:  Cancer-related family history is not on file.    HISTORY OF PRESENT ILLNESS:  Julieta Lagunas is a 59 y.o. female who presents today for Breast cancer treatment follow-up and surveillance.  She reports continued use of tamoxifen. She reports no major side effects to the tamoxifen  and denies any concerns for breast cancer. She denies any more abnormal uterine bleeding.  She reports continued use of Effexor for hot flashes.      She denies any chest pain or breathing issues, no cough or short of breath.      She denies any vision issues, headache issues, dizziness or loss of balance, no falls. She reports some baseline Neuropathy in finger and toes and is on daily gabapentin. She takes a tablet Q am and Qpm.       She denies any new or unexplained bone aches or pains with exception to degenerative changes with legs and hip pains.      She denies any skin lesions or masses, oral sores lesions or infections. She is following with derm annually.      She reports a normal appetite, normal bowels. She reports baseline struggles with bladder spasms and stress incontinence- continues to manage with GYN.      She reports baseline issues with sleep- some insomnia issues. Uses Ambien intermittently. She reports intermittent mild fatigue and will nap on weekends.      She is busy with daily exercise with her dog. She is compliant with daily Vit D, MVI, 500mg calcium daily.     Review of Systems  ROS 14 points performed, See HPI for exceptions    OBJECTIVE:    VS / Pain:  /88   Pulse 68   Temp 36.4 °C (97.5 °F)   Resp 18   Wt 70.8 kg (156 lb 1.4 oz)   SpO2 97%   BMI 26.79 kg/m²   BSA: 1.79 meters squared   Pain Scale: 0  ECO- Fully active, able to carry on all pre-disease performance w/o restriction.             Physical Exam  Constitutional: Well developed, awake/alert/oriented x4, no distress, alert and cooperative  EYES: Sclera clear  ENMT: mucous  membranes moist, no apparent injury, no lesions seen  Head/Neck: Neck supple, no apparent injury, thyroid without mass or tenderness, No JVD, trachea midline, no bruits  Respiratory / Thoracic: Patent airways, clear to all lobes, normal breath sounds with good chest expansion, thorax symmetric.  Cardiovascular: Regular, rate and rhythm, no murmurs, 2+ equal pulses of the extremities, normal auscultated S 1and S 2  GI: Nondistended, soft, non-tender, no rebound tenderness or guarding, no masses palpable, no organomegaly, +BS, no bruits  Musculoskeletal: ROM intact, no joint swelling, normal strength, no spinal tenderness  Extremities: normal extremities, no cyanosis edema, contusions or wounds, no clubbing  Neurological: alert and oriented x4, intact senses, motor, response and reflexes, normal strength  Breast: S/p history of bilateral mastectomies with LEFT FLAP and XRT, Right with silicone implant and reconstruction. No palpable masses or lesion   Lymphatic: No cervical, supraclavicular, infraclavicular or axillary lymphadenopathy  Psychological: Appropriate and talkative mood and behavior  Skin:  Warm and dry, no lesions, no rashes, no jaundice.  Right flank 0.5cm mole consistent with seborrheic keratosis.     Diagnostic Results   CT cardiac scoring wo IV contrast  Narrative: Interpreted By:  Agusto Mcknight,  Nichole Rodgers   STUDY:  CT CARDIAC SCORING WO IV CONTRAST;  5/16/2024 10:24 am      INDICATION:  Signs/Symptoms:snoring and nonrefreshed sleep.      COMPARISON:  Chest CT 03/31/2021, CT cardiac scoring 03/04/2014      ACCESSION NUMBER(S):  [id number]      ORDERING CLINICIAN:  GABRIELLA MARTIN      TECHNIQUE:  Using prospective ECG gating, limited CT scan of the chest for  evaluation of coronary arteries was performed without intravenous  contrast. Coronary calcium scoring was performed according to the  method of Agatston.      FINDINGS:  The score and distribution of calcium in the coronary arteries is  "as  follows:      LM: 0.  LAD: 1.84.  LCx: 0.  RCA: 0.      Total: 1.84, increased from 0 on 03/04/2014.      The visualized segments of the lungs are normally expanded. 3 mm left  lung nodule image 16. Areas of scarring/atelectasis bilaterally. 4 mm  nodular density anteriorly on the right image 14 unchanged dating  back to 03/04/2014. Mild subpleural reticular opacities in the left  upper lobe anteriorly is most consistent with a component of post  radiation changes.      The visualized mid/lower ascending thoracic aorta measures 3.2 cm in  diameter.      The heart is normal in size. No significant pericardial effusion is  present.      No gross evidence of mediastinal or hilar lymphadenopathy is  identified.      Postsurgical changes left mastectomy with TRAM flap reconstruction  noted.      The visualized subdiaphragmatic structures appear grossly intact.      Impression: 1. Coronary artery calcium score of 1.84, increased from 0 on  03/04/2014*.  2. 3 mm left lung nodule not definitely seen previously. Consider  follow-up chest CT in 6 months to evaluate stability.  3. Additional findings as above.      *Coronary artery calcium scoring may be helpful in predicting the  risk for future coronary heart disease events.  According to the  American College of Cardiology Foundation Clinical Expert Consensus  Task Force, such testing provides important prognostic information in  patients with more than one coronary heart disease risk factor. The  coronary artery calcium score correlates with the annual risk of a  non-fatal myocardial infarction or coronary heart disease death.      Coronary artery score            Annual Risk      0-99                             0.4%  100-399                        1.3%  >400                            2.4%      These three \"breakpoints\" correspond to lower, intermediate and high  risk states for future coronary events.  Such information should be  used, along with appropriate " clinical judgment, to make decisions  regarding the intensity of risk factor management strategies to treat  blood lipids and to modify other non-lipid coronary risk factors.      Reference: Houston P et al. Circulation.  2007; 115:402-426      I personally reviewed the images/study and I agree with the findings  as stated. This study was interpreted at [place], [place].      MACRO:  None      Signed by: Agusto Mcknight 5/17/2024 6:05 PM  Dictation workstation:   [id number]         Office Visit on 08/28/2024   Component Date Value Ref Range Status    Case Report 08/28/2024    Final                    Value:Dermatopathology                                  Case: [id number]                                   Authorizing Provider:  Kayla Cabral MD       Collected:           08/28/2024 0947              Ordering Location:     [facility]       Received:            08/28/2024 1624              Pathologist:           Vikram Olivas MD                                                             Specimen:    SKIN, Right Lower Leg - Anterior                                                           FINAL DIAGNOSIS 08/28/2024    Final                    Value:SKIN, RIGHT LOWER LEG - ANTERIOR, SHAVE EXCISION:  DERMATOFIBROMA, PRESENT ON THE DEEP MARGIN.    ** Electronically signed out by Vikram Olivas MD **          08/28/2024    Final                    Value:By the signature on this report, the individual or group listed as making the Final Interpretation/Diagnosis certifies that they have reviewed this case.       Clinical History 08/28/2024    Final                    Value:Encounter Diagnosis: Neoplasm of uncertain behavior of skin       [id number]  Collection Comments: Differential Diagnosis: dermatofibroma  Check Margins Yes/No?:    Comments:    Dermpath Lab: Routine Histopathology (formalin-fixed tissue)  Finding Region: Right Lower Leg - Anterior  Specimen  Objective: 0.8 x 0.6 cm pink-brown firm papule with positive dimple sign, causing pain with shaving and bleeding      Microscopic Description 08/28/2024    Final                    Value:Microscopic analysis shows a circumscribed nodule of dense collagenous stroma in dermis associated with a proliferation of bland fibrohistiocytic cells that entrap collagen bundles on the periphery. The collagen bundles in the nodule are arranged in intertwining strand. Epidermal hyperplasia overlies the dermatofibroma. Mild inflammation is present.      Gross Description 08/28/2024    Final                    Value:A:  Received in formalin is a 9 x 9 x 1 mm piece of skin.  It is tan in color.  It is shave in shape.  It was embedded in toto.  The specimen was inked.      The specimen was grossed by Danielle Asif.            Assessment/Plan   No matching staging information was found for the patient.  Diagnoses and all orders for this visit:  Encounter for follow-up surveillance of breast cancer (Primary)  -     gabapentin (Neurontin) 300 mg capsule; TAKE 1 CAPSULE BY MOUTH TWO TIMES A DAY  -     venlafaxine XR (Effexor-XR) 75 mg 24 hr capsule; TAKE 1 CAPSULE BY MOUTH ONCE DAILY  -     Clinic Appointment Request Follow up; [facility]; Future  -     Referral to Plastic Surgery; Future  History of malignant neoplasm of both breasts  -     gabapentin (Neurontin) 300 mg capsule; TAKE 1 CAPSULE BY MOUTH TWO TIMES A DAY  -     venlafaxine XR (Effexor-XR) 75 mg 24 hr capsule; TAKE 1 CAPSULE BY MOUTH ONCE DAILY  -     Clinic Appointment Request Follow up; [facility]; Future  -     Referral to Plastic Surgery; Future  Encounter for monitoring tamoxifen therapy    Problem List Items Addressed This Visit       History of malignant neoplasm of both breasts    Relevant Medications    gabapentin (Neurontin) 300 mg capsule    venlafaxine XR (Effexor-XR) 75 mg 24 hr capsule    Other Relevant Orders    Clinic Appointment Request  Follow up; [facility]    Referral to Plastic Surgery    Encounter for follow-up surveillance of breast cancer - Primary    Relevant Medications    gabapentin (Neurontin) 300 mg capsule    venlafaxine XR (Effexor-XR) 75 mg 24 hr capsule    Other Relevant Orders    Clinic Appointment Request Follow up; [facility]    Referral to Plastic Surgery    Encounter for monitoring tamoxifen therapy   Bilateral Breast Cancer HR+ October 2012.   Bilateral breast cancer- S/p Neoadjuvant chemotherapy. pCR achieved in right breast, left breast with residual disease and 4/28 LN. Left breast XRT. Anti- estrogen therapy initiated 6/2014. Complicated course with reconstruction. Right with gel silicone  implant, left with Flap.     We have reviewed completion Tamoxifen was due June 2024. She will stop now. We have discussed with change in hot flashes with Tamoxifen completion will need to taper off Effexor. She will call my office if hot flashes resolve.   She has baseline issues with chemotherapy induced neuropathy. Gabapentin and Effexor updated today for a full year. We have reviewed final surveillance visit in 1 year      There is no evidence on clinical exam for breast cancer recurrence. RTC in 1 year     Bone health.   DEXA 12/2021 T-score -1.0. I have instructed her to follow-up with PCP for future testing      General health and Maintenance.  Is well established with Dr. Tamayo. She follows with GYN annually.      At least 25 minutes of direct consultation was spent with the patient today reviewing her cancer care plan, educating and answering questions regarding ongoing follow up, greater than 50% in counseling and coordination of care     Treatment Plan:  [No matching plan found]            Thank you for the opportunity to be involved in the care of Julieta Lagunas. We discussed the clinical significance of diagnosis, goals of care and treatment plan in detail.   Please do not hesitate to reach out with any  questions. Thank you.     -------------------------------------------------------------------------------------------------------------------------------  Karen Fine MSN, APRN, FNP-C  [facility]  Division of Medical Oncology- Breast   Collaborating Physician Dr. Red Mahmood   [facility] Nurse Partners Latasha Mercedes, Eve [place] [address]  Phone: [phone]  Fax: [phone]  Available via Secure Chat    Confidential Peer Review Document-  Privilege  Privileged Pursuant to [place] Revised Code Section 2305.24, .25, .251 & .252

## 2024-09-16 ENCOUNTER — OFFICE VISIT (OUTPATIENT)
Dept: HEMATOLOGY/ONCOLOGY | Facility: HOSPITAL | Age: 59
End: 2024-09-16
Payer: COMMERCIAL

## 2024-09-16 VITALS
SYSTOLIC BLOOD PRESSURE: 136 MMHG | TEMPERATURE: 97.5 F | RESPIRATION RATE: 18 BRPM | OXYGEN SATURATION: 97 % | HEART RATE: 68 BPM | BODY MASS INDEX: 26.79 KG/M2 | DIASTOLIC BLOOD PRESSURE: 88 MMHG | WEIGHT: 156.09 LBS

## 2024-09-16 DIAGNOSIS — Z85.3 HISTORY OF MALIGNANT NEOPLASM OF BOTH BREASTS: ICD-10-CM

## 2024-09-16 DIAGNOSIS — Z51.81 ENCOUNTER FOR MONITORING TAMOXIFEN THERAPY: ICD-10-CM

## 2024-09-16 DIAGNOSIS — Z85.3 ENCOUNTER FOR FOLLOW-UP SURVEILLANCE OF BREAST CANCER: Primary | ICD-10-CM

## 2024-09-16 DIAGNOSIS — Z79.810 ENCOUNTER FOR MONITORING TAMOXIFEN THERAPY: ICD-10-CM

## 2024-09-16 DIAGNOSIS — Z08 ENCOUNTER FOR FOLLOW-UP SURVEILLANCE OF BREAST CANCER: Primary | ICD-10-CM

## 2024-09-16 PROCEDURE — 99215 OFFICE O/P EST HI 40 MIN: CPT | Performed by: NURSE PRACTITIONER

## 2024-09-16 RX ORDER — GABAPENTIN 300 MG/1
300 CAPSULE ORAL 2 TIMES DAILY
Qty: 180 CAPSULE | Refills: 3 | Status: SHIPPED | OUTPATIENT
Start: 2024-09-16 | End: 2025-09-16

## 2024-09-16 RX ORDER — MIRABEGRON 50 MG/1
50 TABLET, FILM COATED, EXTENDED RELEASE ORAL DAILY
COMMUNITY

## 2024-09-16 RX ORDER — VENLAFAXINE HYDROCHLORIDE 75 MG/1
75 CAPSULE, EXTENDED RELEASE ORAL DAILY
Qty: 90 CAPSULE | Refills: 3 | Status: SHIPPED | OUTPATIENT
Start: 2024-09-16 | End: 2025-09-16

## 2024-09-16 ASSESSMENT — PATIENT HEALTH QUESTIONNAIRE - PHQ9
1. LITTLE INTEREST OR PLEASURE IN DOING THINGS: NOT AT ALL
10. IF YOU CHECKED OFF ANY PROBLEMS, HOW DIFFICULT HAVE THESE PROBLEMS MADE IT FOR YOU TO DO YOUR WORK, TAKE CARE OF THINGS AT HOME, OR GET ALONG WITH OTHER PEOPLE: NOT DIFFICULT AT ALL
SUM OF ALL RESPONSES TO PHQ9 QUESTIONS 1 & 2: 0
2. FEELING DOWN, DEPRESSED OR HOPELESS: NOT AT ALL

## 2024-09-16 ASSESSMENT — PAIN SCALES - GENERAL: PAINLEVEL: 0-NO PAIN

## 2024-09-16 NOTE — PATIENT INSTRUCTIONS
Please stop tamoxifen as course completed at 10 years June 2024. Karen RICO, CNP follow-up annually Sept 2025 and will then transition care to PCP    I have updated your gabapentin for neuropathy and Effexor for hot flashes for a full year. You may find with stopping the tamoxifen that hot flashes stop. If this is the case call my office and we will work out a taper dose to discontinue, do not stop cold turkey.     Per your request, I have placed a referral to Dr Liz Chiu for consideration of revision reconstruction      PCP Dr. Tamayo annually and as needed. Annual blood work.     Annual GYN visit     Please call us at [phone] option 5 then option 2 with any questions or concerns

## 2024-11-29 NOTE — PROGRESS NOTES
Plastic Surgery Clinic Visit  Breast Reconstruction    Patient Name: Julieta Lagunas  MRN: [id number]  Date:  12/3/24    Subjective  Julieta Lagunas is a 59 y.o. female S/p Neoadjuvant chemotherapy. pCR achieved in right breast, left breast with residual disease and 4/28 LN. Left breast XRT. Anti- estrogen therapy initiated 6/2014. Complicated course with reconstruction. Right with gel silicone implant, left with Flap. She is referred by KESHAWN Collier    Last Mammogram:     Past Medical History:   Diagnosis Date    Anxiety disorder, unspecified     Anxiety    Cutaneous abscess of left lower limb 08/30/2016    Abscess of knee, left    Displaced fracture of cuboid bone of right foot, initial encounter for closed fracture 09/23/2019    Fracture of cuboid of right foot    Encounter for antineoplastic chemotherapy     Encounter for antineoplastic chemotherapy    Encounter for gynecological examination (general) (routine) without abnormal findings 03/03/2016    Visit for gynecologic examination    Encounter for screening for malignant neoplasm of cervix 03/03/2016    Cervical cancer screening    Other acute postprocedural pain 07/31/2014    Acute postoperative pain    Other conditions influencing health status 05/15/2015    Open wound of trunk, complicated    Other conditions influencing health status     Colonoscopy planned    Personal history of malignant neoplasm of breast     History of malignant neoplasm of breast    Personal history of malignant neoplasm of breast 10/27/2016    History of adenocarcinoma of breast    Personal history of other diseases of the digestive system 03/02/2017    History of constipation    Personal history of other mental and behavioral disorders 10/23/2019    History of depression    Radiodermatitis, unspecified 10/05/2020    Radiation dermatitis    Unspecified abdominal hernia without obstruction or gangrene 09/22/2015    Hernia     Past Surgical History:   Procedure Laterality Date    DILATION  AND CURETTAGE OF UTERUS  12/26/2013    Dilation And Curettage    MASTECTOMY  12/26/2013    Breast Surgery Mastectomy    OTHER SURGICAL HISTORY  12/26/2013    Breast Surgery Revision Of Reconstructed Breast Bilaterally    OTHER SURGICAL HISTORY  12/26/2013    Breast Reconstruction With Tissue Expander Bilateral    OTHER SURGICAL HISTORY  12/22/2014    Breast Surgery Nipple Exploration Left Breast    OTHER SURGICAL HISTORY  02/11/2014    Breast Reconstruction With Implant Prosthesis Bilateral    OTHER SURGICAL HISTORY  09/22/2015    Bilateral Breast Reconstruction With Free Flap    OTHER SURGICAL HISTORY  11/24/2014    Breast Surgery Removal Of Mammary Implant Left Breast    OTHER SURGICAL HISTORY  02/20/2014    Radiation Therapy     Allergies   Allergen Reactions    Latex Rash       Current Outpatient Medications:     aspirin 81 mg EC tablet, Take by mouth., Disp: , Rfl:     calcium carbonate-vitamin D3 1,000 mg-20 mcg (800 unit) tablet, Take by mouth once daily., Disp: , Rfl:     clobetasol (Temovate) 0.05 % ointment, Apply topically 2 times a day. For up to 2 weeks to areas of bee sting reaction on arms, legs, body, Disp: 30 g, Rfl: 1    gabapentin (Neurontin) 300 mg capsule, TAKE 1 CAPSULE BY MOUTH TWO TIMES A DAY, Disp: 180 capsule, Rfl: 3    hydroCHLOROthiazide (HYDRODiuril) 25 mg tablet, TAKE 1 TABLET(25 MG) BY MOUTH EVERY DAY, Disp: 90 tablet, Rfl: 1    losartan (Cozaar) 25 mg tablet, Take by mouth. (Patient taking differently: Take 1 tablet (25 mg) by mouth once daily.), Disp: , Rfl:     metoprolol succinate XL (Toprol-XL) 50 mg 24 hr tablet, TAKE 1 TABLET(50 MG) BY MOUTH EVERY DAY, Disp: 90 tablet, Rfl: 1    multivitamin (Multiple Vitamins) tablet, Take by mouth., Disp: , Rfl:     tolterodine LA (Detrol LA) 2 mg 24 hr capsule, TAKE 1 CAPSULE(2 MG) BY MOUTH DAILY. DO NOT CRUSH, CHEW, OR SPLIT, Disp: 90 capsule, Rfl: 3    venlafaxine XR (Effexor-XR) 75 mg 24 hr capsule, TAKE 1 CAPSULE BY MOUTH ONCE DAILY,  Disp: 90 capsule, Rfl: 3    mirabegron (Myrbetriq) 50 mg tablet extended release 24 hr 24 hr tablet, Take 1 tablet (50 mg) by mouth once daily., Disp: , Rfl:     omeprazole (PriLOSEC) 20 mg DR capsule, Take 1 capsule (20 mg) by mouth 2 times a day., Disp: , Rfl:     zolpidem (Ambien) 10 mg tablet, Take by mouth., Disp: , Rfl:    No family history on file.  Social History     Tobacco Use    Smoking status: Every Day     Types: Cigarettes     Passive exposure: Never    Smokeless tobacco: Never   Substance Use Topics    Drug use: Never       ROS:  ROS: All 10 systems were reviewed and are unremarkable except for those mentioned in HPI.    Objective    /82 (BP Location: Right arm)   Pulse 66   Temp 36.3 °C (97.4 °F)   Wt 72.3 kg (159 lb 6.4 oz)   BMI 27.36 kg/m²      Physical Exam:   Constitutional: A&Ox3, calm and cooperative, NAD  Eyes: PERRL, clear sclera   ENMT: Moist mucous membranes, no apparent injuries or lesions  Head/Neck: Neck supple, no JVD  Cardiovascular: Normal rate and regular rhythm, 2+ equal pulses of the distal extremities  Respiratory/Thorax: CTAB, regular respirations on RA, good symmetric chest expansion  Gastrointestinal: Abdomen soft, non tender   Extremities: No peripheral edema  Neurological: A&Ox3  Psychological: Appropriate mood and behavior  Skin: Warm and dry with no lesions or rashes    Breast Exam:    Surgical absence of both breast, implant on the right, sub pectoralis and animation deformity, her folds are uneven, large skin paddle of her ALEJANDRO on left side, R fold is above the left by about 2 cm, also lost its definition especially medially, a little bit of skin redundancy laterally and a large transverse incision. On the left side she has a large skin paddle with good resurfacing on the breast mount, however her fold is too low and she is flat. She has hollowing at the superior pole including the anterior axillary line, as well as some irregularity of the tissue superior  laterally.  The bulge has gotten larger.       Chest width 15        Implant Information:   Date of Surgery: 5/2015  Type of implant: Delayed reconstruction of the left breast with deep inferior epigastric  flap. 2. Extensive capsulotomy and resection of portion of radiated skin, subcutaneous tissue, and fascia on the left heath        Photos  Completed at this visit      Diagnostics   No results found for this or any previous visit (from the past 24 hours).  No results found.    Assessment/Plan Julieta Lagunas is a 59 y.o. female s/p Neoadjuvant chemotherapy. pCR achieved in right breast, left breast with residual disease and 4/28 LN. Left breast XRT. Anti- estrogen therapy initiated 6/2014. Complicated course with reconstruction. Right with gel silicone implant, left with Flap. She is referred by KESHAWN Collier On examination, she has surgical absence of both breast, implant on the right, sub pectoralis and animation deformity, her folds are uneven, large skin paddle of her ALEJANDRO on left side, R fold is above the left by about 2 cm, also lost its definition especially medially, a little bit of skin redundancy laterally and a large transverse incision. On the left side she has a large skin paddle with good resurfacing on the breast mount, however her fold is too low and she is flat. She has hollowing at the superior pole including the anterior axillary line, as well as some irregularity of the tissue superior laterally. The bulge has gotten larger. After a long discussion, we will proceed with repairing the bulge, implant exchange and revision of recon breast on the right. Revision of recon breast on the left as well along with fat grafting to the left. The implant exchange, we will need to define her folds and upsize her to give her more projection. She will need to determine if she wants to exchange the plane from pre pectoralis to sub pectoralis. She occasionally vapes, discussed she will need to stop before  proceeding with surgery. Discussed post op care including time of from work.       CT abd/pelvis to ensure no hernia vs diastasis regarding the bulge    Negative nicotine test  We will complete photos today with vectra   Will plan surgery on 3/19/25 in  [facility] in 2/25/25 feb pre op     Scribe Attestation  By signing my name below, LORY Jerome Merritt, Scribe, attest that this documentation has been prepared under the direction and in the presence of Liz Chiu MD.   Verbal consent was obtained.      Attending Attestation:  ILiz MD, personal performed the history, exam, and decision making on this patient.  A total of 45 mins were spent in patient counseling, review of medical records, and coordination of care.

## 2024-12-03 ENCOUNTER — HOSPITAL ENCOUNTER (OUTPATIENT)
Facility: HOSPITAL | Age: 59
Setting detail: OUTPATIENT SURGERY
End: 2024-12-03
Attending: SURGERY | Admitting: SURGERY
Payer: COMMERCIAL

## 2024-12-03 ENCOUNTER — APPOINTMENT (OUTPATIENT)
Dept: PLASTIC SURGERY | Facility: CLINIC | Age: 59
End: 2024-12-03
Payer: COMMERCIAL

## 2024-12-03 VITALS
DIASTOLIC BLOOD PRESSURE: 82 MMHG | SYSTOLIC BLOOD PRESSURE: 129 MMHG | BODY MASS INDEX: 27.36 KG/M2 | HEART RATE: 66 BPM | TEMPERATURE: 97.4 F | WEIGHT: 159.4 LBS

## 2024-12-03 DIAGNOSIS — N65.1 BREAST ASYMMETRY BETWEEN NATIVE BREAST AND RECONSTRUCTED BREAST: Primary | ICD-10-CM

## 2024-12-03 DIAGNOSIS — Z85.3 HISTORY OF MALIGNANT NEOPLASM OF BOTH BREASTS: ICD-10-CM

## 2024-12-03 DIAGNOSIS — Z85.3 ENCOUNTER FOR FOLLOW-UP SURVEILLANCE OF BREAST CANCER: ICD-10-CM

## 2024-12-03 DIAGNOSIS — R19.00 ABDOMINAL WALL BULGE: ICD-10-CM

## 2024-12-03 DIAGNOSIS — Z08 ENCOUNTER FOR FOLLOW-UP SURVEILLANCE OF BREAST CANCER: ICD-10-CM

## 2024-12-03 PROCEDURE — 99204 OFFICE O/P NEW MOD 45 MIN: CPT | Performed by: SURGERY

## 2024-12-03 RX ORDER — ACETAMINOPHEN 325 MG/1
975 TABLET ORAL ONCE
OUTPATIENT
Start: 2024-12-03 | End: 2024-12-03

## 2024-12-03 RX ORDER — CEFAZOLIN SODIUM 2 G/100ML
2 INJECTION, SOLUTION INTRAVENOUS ONCE
OUTPATIENT
Start: 2024-12-03 | End: 2024-12-03

## 2024-12-03 RX ORDER — GABAPENTIN 300 MG/1
600 CAPSULE ORAL ONCE
OUTPATIENT
Start: 2024-12-03 | End: 2024-12-03

## 2024-12-03 RX ORDER — APREPITANT 40 MG/1
40 CAPSULE ORAL ONCE
OUTPATIENT
Start: 2024-12-03 | End: 2024-12-03

## 2024-12-03 ASSESSMENT — PAIN SCALES - GENERAL: PAINLEVEL_OUTOF10: 0-NO PAIN

## 2024-12-16 ENCOUNTER — HOSPITAL ENCOUNTER (OUTPATIENT)
Dept: RADIOLOGY | Facility: CLINIC | Age: 59
Discharge: HOME | End: 2024-12-16
Payer: COMMERCIAL

## 2024-12-16 DIAGNOSIS — Z85.3 HISTORY OF MALIGNANT NEOPLASM OF BOTH BREASTS: ICD-10-CM

## 2024-12-16 PROCEDURE — 74176 CT ABD & PELVIS W/O CONTRAST: CPT

## 2024-12-16 PROCEDURE — 74176 CT ABD & PELVIS W/O CONTRAST: CPT | Performed by: RADIOLOGY

## 2025-01-08 DIAGNOSIS — Z85.3 HISTORY OF MALIGNANT NEOPLASM OF BOTH BREASTS: ICD-10-CM

## 2025-01-13 ENCOUNTER — TELEPHONE (OUTPATIENT)
Dept: PLASTIC SURGERY | Facility: CLINIC | Age: 60
End: 2025-01-13
Payer: COMMERCIAL

## 2025-01-13 NOTE — TELEPHONE ENCOUNTER
Outgoing call regarding nicotine test. Pt states she has not yet fully quit but will by the end of this week. Per the patient she is scheduled to draw nicotine lab on 2/28/25. Will follow up with results

## 2025-02-14 DIAGNOSIS — I10 BENIGN ESSENTIAL HYPERTENSION: ICD-10-CM

## 2025-02-15 RX ORDER — METOPROLOL SUCCINATE 50 MG/1
50 TABLET, EXTENDED RELEASE ORAL DAILY
Qty: 90 TABLET | Refills: 0 | Status: SHIPPED | OUTPATIENT
Start: 2025-02-15 | End: 2025-05-09 | Stop reason: SDUPTHER

## 2025-02-15 RX ORDER — HYDROCHLOROTHIAZIDE 25 MG/1
25 TABLET ORAL DAILY
Qty: 90 TABLET | Refills: 0 | Status: SHIPPED | OUTPATIENT
Start: 2025-02-15

## 2025-02-25 ENCOUNTER — APPOINTMENT (OUTPATIENT)
Dept: PLASTIC SURGERY | Facility: CLINIC | Age: 60
End: 2025-02-25
Payer: COMMERCIAL

## 2025-02-26 LAB
COTININE SERPL-MCNC: 17 NG/ML
NICOTINE SERPL-MCNC: <2 NG/ML

## 2025-03-04 DIAGNOSIS — R19.00 ABDOMINAL WALL BULGE: ICD-10-CM

## 2025-03-04 DIAGNOSIS — N65.1 BREAST ASYMMETRY BETWEEN NATIVE BREAST AND RECONSTRUCTED BREAST: ICD-10-CM

## 2025-03-11 ENCOUNTER — APPOINTMENT (OUTPATIENT)
Dept: PLASTIC SURGERY | Facility: CLINIC | Age: 60
End: 2025-03-11
Payer: COMMERCIAL

## 2025-03-24 ENCOUNTER — TELEPHONE (OUTPATIENT)
Dept: PULMONOLOGY | Facility: CLINIC | Age: 60
End: 2025-03-24

## 2025-03-28 ENCOUNTER — TELEPHONE (OUTPATIENT)
Dept: PLASTIC SURGERY | Facility: CLINIC | Age: 60
End: 2025-03-28
Payer: COMMERCIAL

## 2025-03-28 NOTE — TELEPHONE ENCOUNTER
Spoke with patient and reminded her to go to lab to get her urine nicotine screen completed on Monday 3/31/25.  She verbalized understanding.

## 2025-04-04 DIAGNOSIS — N65.1 BREAST ASYMMETRY BETWEEN NATIVE BREAST AND RECONSTRUCTED BREAST: ICD-10-CM

## 2025-04-06 LAB
COTININE SERPL-MCNC: 10 NG/ML
NICOTINE SERPL-MCNC: <2 NG/ML

## 2025-04-07 NOTE — H&P (VIEW-ONLY)
"PLASTIC SURGERY PRE-OP CLINIC NOTE  Date:4/8/25  Subjective :  Patient ID: Julieta Lagunas  is a 59 y.o. female is here for pre-op appointment     Planned Date of Procedure: 5/1/25  Planned Surgical Procedure: Left Reconstruction Revision including fat grafting; Right revision of reconstructed breast with Exchange of implant, change of plane and placement of mesh support for Symmetry     HPI:   Julieta Lagunas is a 59 y.o. female is here for pre-operative appointment for the above procedure(s).     Currently, the patient states there were no changes in their medications or medical history.     Patient's vitals are Blood pressure 138/90, pulse 69, height 1.626 m (5' 4\"), weight 73.9 kg (163 lb).   today.     Patient is not currently on an ACE, ARB, or Diuretic.     Patient has Good Rx Card.     Patient is not on chronic NSAIDs.       MEDICATIONS    Current Outpatient Medications:     aspirin 81 mg EC tablet, Take by mouth., Disp: , Rfl:     calcium carbonate-vitamin D3 1,000 mg-20 mcg (800 unit) tablet, Take by mouth once daily., Disp: , Rfl:     clobetasol (Temovate) 0.05 % ointment, Apply topically 2 times a day. For up to 2 weeks to areas of bee sting reaction on arms, legs, body, Disp: 30 g, Rfl: 1    gabapentin (Neurontin) 300 mg capsule, TAKE 1 CAPSULE BY MOUTH TWO TIMES A DAY, Disp: 180 capsule, Rfl: 3    hydroCHLOROthiazide (HYDRODiuril) 25 mg tablet, Take 1 tablet (25 mg) by mouth once daily., Disp: 90 tablet, Rfl: 0    losartan (Cozaar) 25 mg tablet, Take by mouth. (Patient taking differently: Take 1 tablet (25 mg) by mouth once daily.), Disp: , Rfl:     metoprolol succinate XL (Toprol-XL) 50 mg 24 hr tablet, Take 1 tablet (50 mg) by mouth once daily., Disp: 90 tablet, Rfl: 0    mirabegron (Myrbetriq) 50 mg tablet extended release 24 hr 24 hr tablet, Take 1 tablet (50 mg) by mouth once daily., Disp: , Rfl:     multivitamin (Multiple Vitamins) tablet, Take by mouth., Disp: , Rfl:     omeprazole (PriLOSEC) 20 mg " DR capsule, Take 1 capsule (20 mg) by mouth 2 times a day., Disp: , Rfl:     tolterodine LA (Detrol LA) 2 mg 24 hr capsule, TAKE 1 CAPSULE(2 MG) BY MOUTH DAILY. DO NOT CRUSH, CHEW, OR SPLIT, Disp: 90 capsule, Rfl: 3    venlafaxine XR (Effexor-XR) 75 mg 24 hr capsule, TAKE 1 CAPSULE BY MOUTH ONCE DAILY, Disp: 90 capsule, Rfl: 3    zolpidem (Ambien) 10 mg tablet, Take by mouth., Disp: , Rfl:      REVIEW OF SYSTEMS:    Constitutional: negative for fevers, chills, unintentional weight loss  HEENT: negative for changes in vision, headaches, changes in hearing, congestion, sore throat  Cardiovascular: negative for chest pain, palpitations  Respiratory: negative for cough, shortness of breath  Gastrointestinal: negative for nausea, vomiting, diarrhea  Genitourinary: negative for dysuria, hematuria  Musculoskeletal: negative for joint swelling or pain  Skin: negative for rashes or lesions  Psych: negative for depression, anxiety  Endocrine: negative for polyuria, polydipsia, cold/heat intolerance  Hem/Lymph: negative for bleeding disorder    PHYSICAL EXAM  General: alert and oriented, no apparent distress  Psych: normal mood and affect, judgement intact.   Eyes: No conjunctival erythema, extraocular movements intact, no scleral icterus, pupils equal and reactive to light  HENT: normocephalic, atraumatic, no rhinorrhea, no trauma to external meatus, no prior surgical scars  CV: hemodynamically stable  Resp: unlabored, no increased work of breathing, equal bilateral chest rise, no cough or stridor  Abdomen: soft, non-tender, non-distended. No surgical scars present. No rashes noted. No rectus diastasis present   Neuro: Unremarkable without focal findings, AAOx3, CN 2-12 grossly intact  Extremities/Musculoskeletal: Upper and lower extremities are atraumatic in appearance without tenderness or deformity. No swelling or erythema. Full range of motion is noted to all joints. Steady gait noted.    Skin: Intact, soft and warm; no  rashes, lesions, or bruising. No large masses or keloids noted on exam.     Focused exam of the breasts:   Surgical absence of both breast, implant on the right, sub pectoralis and animation deformity, her folds are uneven, large skin paddle of her ALEJANDRO on left side, R fold is above the left by about 2 cm, also lost its definition especially medially, a little bit of skin redundancy laterally and a large transverse incision. On the left side she has a large skin paddle with good resurfacing on the breast mount, however her fold is too low and she is flat. She has hollowing at the superior pole including the anterior axillary line, as well as some irregularity of the tissue superior laterally.  The bulge has gotten larger.      Chest width 15  BW: 15cm    LABORATORY  Nutrition  Lab Results   Component Value Date    ALBUMIN 4.4 11/11/2021          ASSESSMENT/PLAN  Julieta Lagunas is a 59 y.o. female s/p Neoadjuvant chemotherapy. pCR achieved in right breast, left breast with residual disease and 4/28 LN. Left breast XRT. Anti- estrogen therapy initiated 6/2014. Complicated course with reconstruction. Right with gel silicone implant, left with Flap.      The patient will be undergoing Left Reconstruction Revision. Will take fat graft from left flap while shaping. Revision of right breast reconstruction with change of plane, inferior mesh support, implant exchange for Symmetry on 5/1/25 at [place]    Informed consent discussed with the patient, including: condition, proposed care, treatments and services, alternative forms of treatment, and risks of no treatment.  Details discussed around the procedures to be used, and the risks and hazards involved, potential benefits, and side effects of the patient's proposed care, treatment, and services; the likelihood of the patient achieving his or her goals; and any potential problems that might occur during recuperation. Reasonable alternative also discussed with the patient's  proposed care, treatment, and services. The discussion encompasses risks, benefits, and side effects related to the alternative and risks related to not receiving the proposed care, treatment, and services. Written informed consent was obtained.    The patient was counseled to avoid anticoagulation medications and herbals including - but not limited to - ASA, NSAIDs, Plavix, fish oils, and green tea    Patient was counseled to avoid nicotine and areas with high amounts of secondhand smoke.     Patient was counseled to increase protein intake after surgery to aid with wound healing with goal of 120g/day.     Patient was counseled on need for compression garments and/or support bras, given information about where to acquire these garments, and instructions to bring with on the day of surgery.     We discussed postoperative follow-up visits, recuperation and return to work.    Advised patient to contact office with any questions or concerns    All findings and diagnosis was discussed with patient at the time of this visit. Patient states they understand and are in agreement with the treatment plan at the time of this visit.     Photos completed 12/3/24    Medications eRx'd:  -Celebrex 200 mg BID with meals- Good Rx card provided to the patient.  -Gabapentin 600 mg Q8H  -Tylenol 1000 mg Q8H  -BactrimDS BID x 7 days   -Hibiclens - instructed the patient to wash breast and/or abdomen and margins the night before surgery or the morning of surgery; avoid washing face/eyes (2 packets if breast only, and 5 packets if breast and abdomen)      RTC 5/13/25 after surgery     Attending Attestation:  Liz HENLEY MD, personal performed the history, exam, and decision making on this patient.  A total of 40 mins were spent in patient counseling, review of medical records, and coordination of care.

## 2025-04-07 NOTE — PROGRESS NOTES
"PLASTIC SURGERY PRE-OP CLINIC NOTE  Date:4/8/25  Subjective :  Patient ID: Julieta Lagunas  is a 59 y.o. female is here for pre-op appointment     Planned Date of Procedure: 5/1/25  Planned Surgical Procedure: Left Reconstruction Revision including fat grafting; Right revision of reconstructed breast with Exchange of implant, change of plane and placement of mesh support for Symmetry     HPI:   Julieta Lagunas is a 59 y.o. female is here for pre-operative appointment for the above procedure(s).     Currently, the patient states there were no changes in their medications or medical history.     Patient's vitals are Blood pressure 138/90, pulse 69, height 1.626 m (5' 4\"), weight 73.9 kg (163 lb).   today.     Patient is not currently on an ACE, ARB, or Diuretic.     Patient has Good Rx Card.     Patient is not on chronic NSAIDs.       MEDICATIONS    Current Outpatient Medications:     aspirin 81 mg EC tablet, Take by mouth., Disp: , Rfl:     calcium carbonate-vitamin D3 1,000 mg-20 mcg (800 unit) tablet, Take by mouth once daily., Disp: , Rfl:     clobetasol (Temovate) 0.05 % ointment, Apply topically 2 times a day. For up to 2 weeks to areas of bee sting reaction on arms, legs, body, Disp: 30 g, Rfl: 1    gabapentin (Neurontin) 300 mg capsule, TAKE 1 CAPSULE BY MOUTH TWO TIMES A DAY, Disp: 180 capsule, Rfl: 3    hydroCHLOROthiazide (HYDRODiuril) 25 mg tablet, Take 1 tablet (25 mg) by mouth once daily., Disp: 90 tablet, Rfl: 0    losartan (Cozaar) 25 mg tablet, Take by mouth. (Patient taking differently: Take 1 tablet (25 mg) by mouth once daily.), Disp: , Rfl:     metoprolol succinate XL (Toprol-XL) 50 mg 24 hr tablet, Take 1 tablet (50 mg) by mouth once daily., Disp: 90 tablet, Rfl: 0    mirabegron (Myrbetriq) 50 mg tablet extended release 24 hr 24 hr tablet, Take 1 tablet (50 mg) by mouth once daily., Disp: , Rfl:     multivitamin (Multiple Vitamins) tablet, Take by mouth., Disp: , Rfl:     omeprazole (PriLOSEC) 20 mg " DR capsule, Take 1 capsule (20 mg) by mouth 2 times a day., Disp: , Rfl:     tolterodine LA (Detrol LA) 2 mg 24 hr capsule, TAKE 1 CAPSULE(2 MG) BY MOUTH DAILY. DO NOT CRUSH, CHEW, OR SPLIT, Disp: 90 capsule, Rfl: 3    venlafaxine XR (Effexor-XR) 75 mg 24 hr capsule, TAKE 1 CAPSULE BY MOUTH ONCE DAILY, Disp: 90 capsule, Rfl: 3    zolpidem (Ambien) 10 mg tablet, Take by mouth., Disp: , Rfl:      REVIEW OF SYSTEMS:    Constitutional: negative for fevers, chills, unintentional weight loss  HEENT: negative for changes in vision, headaches, changes in hearing, congestion, sore throat  Cardiovascular: negative for chest pain, palpitations  Respiratory: negative for cough, shortness of breath  Gastrointestinal: negative for nausea, vomiting, diarrhea  Genitourinary: negative for dysuria, hematuria  Musculoskeletal: negative for joint swelling or pain  Skin: negative for rashes or lesions  Psych: negative for depression, anxiety  Endocrine: negative for polyuria, polydipsia, cold/heat intolerance  Hem/Lymph: negative for bleeding disorder    PHYSICAL EXAM  General: alert and oriented, no apparent distress  Psych: normal mood and affect, judgement intact.   Eyes: No conjunctival erythema, extraocular movements intact, no scleral icterus, pupils equal and reactive to light  HENT: normocephalic, atraumatic, no rhinorrhea, no trauma to external meatus, no prior surgical scars  CV: hemodynamically stable  Resp: unlabored, no increased work of breathing, equal bilateral chest rise, no cough or stridor  Abdomen: soft, non-tender, non-distended. No surgical scars present. No rashes noted. No rectus diastasis present   Neuro: Unremarkable without focal findings, AAOx3, CN 2-12 grossly intact  Extremities/Musculoskeletal: Upper and lower extremities are atraumatic in appearance without tenderness or deformity. No swelling or erythema. Full range of motion is noted to all joints. Steady gait noted.    Skin: Intact, soft and warm; no  rashes, lesions, or bruising. No large masses or keloids noted on exam.     Focused exam of the breasts:   Surgical absence of both breast, implant on the right, sub pectoralis and animation deformity, her folds are uneven, large skin paddle of her ALEJANDRO on left side, R fold is above the left by about 2 cm, also lost its definition especially medially, a little bit of skin redundancy laterally and a large transverse incision. On the left side she has a large skin paddle with good resurfacing on the breast mount, however her fold is too low and she is flat. She has hollowing at the superior pole including the anterior axillary line, as well as some irregularity of the tissue superior laterally.  The bulge has gotten larger.      Chest width 15  BW: 15cm    LABORATORY  Nutrition  Lab Results   Component Value Date    ALBUMIN 4.4 11/11/2021          ASSESSMENT/PLAN  Julieta Lagunas is a 59 y.o. female s/p Neoadjuvant chemotherapy. pCR achieved in right breast, left breast with residual disease and 4/28 LN. Left breast XRT. Anti- estrogen therapy initiated 6/2014. Complicated course with reconstruction. Right with gel silicone implant, left with Flap.      The patient will be undergoing Left Reconstruction Revision. Will take fat graft from left flap while shaping. Revision of right breast reconstruction with change of plane, inferior mesh support, implant exchange for Symmetry on 5/1/25 at [facility]    Informed consent discussed with the patient, including: condition, proposed care, treatments and services, alternative forms of treatment, and risks of no treatment.  Details discussed around the procedures to be used, and the risks and hazards involved, potential benefits, and side effects of the patient's proposed care, treatment, and services; the likelihood of the patient achieving his or her goals; and any potential problems that might occur during recuperation. Reasonable alternative also discussed with the patient's  proposed care, treatment, and services. The discussion encompasses risks, benefits, and side effects related to the alternative and risks related to not receiving the proposed care, treatment, and services. Written informed consent was obtained.    The patient was counseled to avoid anticoagulation medications and herbals including - but not limited to - ASA, NSAIDs, Plavix, fish oils, and green tea    Patient was counseled to avoid nicotine and areas with high amounts of secondhand smoke.     Patient was counseled to increase protein intake after surgery to aid with wound healing with goal of 120g/day.     Patient was counseled on need for compression garments and/or support bras, given information about where to acquire these garments, and instructions to bring with on the day of surgery.     We discussed postoperative follow-up visits, recuperation and return to work.    Advised patient to contact office with any questions or concerns    All findings and diagnosis was discussed with patient at the time of this visit. Patient states they understand and are in agreement with the treatment plan at the time of this visit.     Photos completed 12/3/24    Medications eRx'd:  -Celebrex 200 mg BID with meals- Good Rx card provided to the patient.  -Gabapentin 600 mg Q8H  -Tylenol 1000 mg Q8H  -BactrimDS BID x 7 days   -Hibiclens - instructed the patient to wash breast and/or abdomen and margins the night before surgery or the morning of surgery; avoid washing face/eyes (2 packets if breast only, and 5 packets if breast and abdomen)      RTC 5/13/25 after surgery     Attending Attestation:  Liz HENLEY MD, personal performed the history, exam, and decision making on this patient.  A total of 40 mins were spent in patient counseling, review of medical records, and coordination of care.

## 2025-04-08 ENCOUNTER — APPOINTMENT (OUTPATIENT)
Dept: PLASTIC SURGERY | Facility: CLINIC | Age: 60
End: 2025-04-08
Payer: COMMERCIAL

## 2025-04-08 VITALS
HEART RATE: 69 BPM | SYSTOLIC BLOOD PRESSURE: 138 MMHG | HEIGHT: 64 IN | WEIGHT: 163 LBS | BODY MASS INDEX: 27.83 KG/M2 | DIASTOLIC BLOOD PRESSURE: 90 MMHG

## 2025-04-08 DIAGNOSIS — G89.18 POST-OP PAIN: ICD-10-CM

## 2025-04-08 DIAGNOSIS — T85.44XS CAPSULAR CONTRACTURE OF BREAST IMPLANT, SEQUELA: Primary | ICD-10-CM

## 2025-04-08 DIAGNOSIS — R19.00 ABDOMINAL WALL BULGE: ICD-10-CM

## 2025-04-08 DIAGNOSIS — N65.1 BREAST ASYMMETRY FOLLOWING RECONSTRUCTIVE SURGERY: ICD-10-CM

## 2025-04-08 PROBLEM — T85.44XA CAPSULAR CONTRACTURE OF BREAST IMPLANT: Status: ACTIVE | Noted: 2025-04-08

## 2025-04-08 PROCEDURE — 3008F BODY MASS INDEX DOCD: CPT | Performed by: SURGERY

## 2025-04-08 PROCEDURE — 99215 OFFICE O/P EST HI 40 MIN: CPT | Performed by: SURGERY

## 2025-04-08 RX ORDER — APREPITANT 40 MG/1
40 CAPSULE ORAL ONCE
OUTPATIENT
Start: 2025-04-08 | End: 2025-04-08

## 2025-04-08 RX ORDER — ACETAMINOPHEN 500 MG
1000 TABLET ORAL EVERY 8 HOURS
Qty: 84 TABLET | Refills: 0 | Status: SHIPPED | OUTPATIENT
Start: 2025-04-08 | End: 2025-04-22

## 2025-04-08 RX ORDER — GABAPENTIN 300 MG/1
600 CAPSULE ORAL ONCE
OUTPATIENT
Start: 2025-04-08 | End: 2025-04-08

## 2025-04-08 RX ORDER — DIAZEPAM 5 MG/1
5 TABLET ORAL ONCE
Qty: 1 TABLET | Refills: 0 | Status: SHIPPED | OUTPATIENT
Start: 2025-04-08 | End: 2025-04-08

## 2025-04-08 RX ORDER — CHLORHEXIDINE GLUCONATE 40 MG/ML
SOLUTION TOPICAL ONCE
Qty: 118 ML | Refills: 0 | Status: SHIPPED | OUTPATIENT
Start: 2025-04-08 | End: 2025-04-08

## 2025-04-08 RX ORDER — CELECOXIB 200 MG/1
200 CAPSULE ORAL 2 TIMES DAILY
Qty: 28 CAPSULE | Refills: 0 | Status: SHIPPED | OUTPATIENT
Start: 2025-04-08 | End: 2025-04-22

## 2025-04-08 RX ORDER — CEFAZOLIN SODIUM 2 G/100ML
2 INJECTION, SOLUTION INTRAVENOUS ONCE
OUTPATIENT
Start: 2025-04-08 | End: 2025-04-08

## 2025-04-08 RX ORDER — ACETAMINOPHEN 325 MG/1
975 TABLET ORAL ONCE
OUTPATIENT
Start: 2025-04-08 | End: 2025-04-08

## 2025-04-08 RX ORDER — SULFAMETHOXAZOLE AND TRIMETHOPRIM 800; 160 MG/1; MG/1
1 TABLET ORAL 2 TIMES DAILY
Qty: 28 TABLET | Refills: 0 | Status: SHIPPED | OUTPATIENT
Start: 2025-04-08 | End: 2025-04-22

## 2025-04-08 RX ORDER — GABAPENTIN 300 MG/1
300 CAPSULE ORAL EVERY 8 HOURS
Qty: 42 CAPSULE | Refills: 0 | Status: SHIPPED | OUTPATIENT
Start: 2025-04-08 | End: 2025-04-22

## 2025-04-29 ENCOUNTER — APPOINTMENT (OUTPATIENT)
Dept: PLASTIC SURGERY | Facility: CLINIC | Age: 60
End: 2025-04-29
Payer: COMMERCIAL

## 2025-04-30 ENCOUNTER — ANESTHESIA EVENT (OUTPATIENT)
Dept: OPERATING ROOM | Facility: CLINIC | Age: 60
End: 2025-04-30
Payer: COMMERCIAL

## 2025-05-01 ENCOUNTER — HOSPITAL ENCOUNTER (OUTPATIENT)
Facility: CLINIC | Age: 60
Setting detail: OUTPATIENT SURGERY
Discharge: HOME | End: 2025-05-01
Attending: SURGERY | Admitting: SURGERY
Payer: COMMERCIAL

## 2025-05-01 ENCOUNTER — ANESTHESIA (OUTPATIENT)
Dept: OPERATING ROOM | Facility: CLINIC | Age: 60
End: 2025-05-01
Payer: COMMERCIAL

## 2025-05-01 ENCOUNTER — PHARMACY VISIT (OUTPATIENT)
Dept: PHARMACY | Facility: CLINIC | Age: 60
End: 2025-05-01
Payer: COMMERCIAL

## 2025-05-01 VITALS
SYSTOLIC BLOOD PRESSURE: 113 MMHG | WEIGHT: 161.16 LBS | DIASTOLIC BLOOD PRESSURE: 69 MMHG | TEMPERATURE: 96.8 F | BODY MASS INDEX: 27.51 KG/M2 | HEIGHT: 64 IN | RESPIRATION RATE: 18 BRPM | OXYGEN SATURATION: 98 % | HEART RATE: 74 BPM

## 2025-05-01 DIAGNOSIS — N65.1 BREAST ASYMMETRY FOLLOWING RECONSTRUCTIVE SURGERY: ICD-10-CM

## 2025-05-01 DIAGNOSIS — G89.18 PAIN ASSOCIATED WITH SURGICAL PROCEDURE: ICD-10-CM

## 2025-05-01 DIAGNOSIS — T85.44XS CAPSULAR CONTRACTURE OF BREAST IMPLANT, SEQUELA: Primary | ICD-10-CM

## 2025-05-01 PROCEDURE — 3700000001 HC GENERAL ANESTHESIA TIME - INITIAL BASE CHARGE: Performed by: SURGERY

## 2025-05-01 PROCEDURE — 96372 THER/PROPH/DIAG INJ SC/IM: CPT | Performed by: SURGERY

## 2025-05-01 PROCEDURE — 7100000010 HC PHASE TWO TIME - EACH INCREMENTAL 1 MINUTE: Performed by: SURGERY

## 2025-05-01 PROCEDURE — 88304 TISSUE EXAM BY PATHOLOGIST: CPT | Mod: TC,SUR,ELYLAB | Performed by: SURGERY

## 2025-05-01 PROCEDURE — 19380 REVJ RECONSTRUCTED BREAST: CPT | Performed by: SURGERY

## 2025-05-01 PROCEDURE — 3600000008 HC OR TIME - EACH INCREMENTAL 1 MINUTE - PROCEDURE LEVEL THREE: Performed by: SURGERY

## 2025-05-01 PROCEDURE — 15772 GRFG AUTOL FAT LIPO EA ADDL: CPT | Performed by: SURGERY

## 2025-05-01 PROCEDURE — C1789 PROSTHESIS, BREAST, IMP: HCPCS | Performed by: SURGERY

## 2025-05-01 PROCEDURE — 7100000009 HC PHASE TWO TIME - INITIAL BASE CHARGE: Performed by: SURGERY

## 2025-05-01 PROCEDURE — 2720000007 HC OR 272 NO HCPCS: Performed by: SURGERY

## 2025-05-01 PROCEDURE — RXMED WILLOW AMBULATORY MEDICATION CHARGE

## 2025-05-01 PROCEDURE — C1781 MESH (IMPLANTABLE): HCPCS | Performed by: SURGERY

## 2025-05-01 PROCEDURE — 2500000001 HC RX 250 WO HCPCS SELF ADMINISTERED DRUGS (ALT 637 FOR MEDICARE OP): Performed by: ANESTHESIOLOGY

## 2025-05-01 PROCEDURE — 2500000004 HC RX 250 GENERAL PHARMACY W/ HCPCS (ALT 636 FOR OP/ED): Performed by: SURGERY

## 2025-05-01 PROCEDURE — 3700000002 HC GENERAL ANESTHESIA TIME - EACH INCREMENTAL 1 MINUTE: Performed by: SURGERY

## 2025-05-01 PROCEDURE — 96372 THER/PROPH/DIAG INJ SC/IM: CPT | Performed by: NURSE ANESTHETIST, CERTIFIED REGISTERED

## 2025-05-01 PROCEDURE — 2780000003 HC OR 278 NO HCPCS: Performed by: SURGERY

## 2025-05-01 PROCEDURE — 7100000001 HC RECOVERY ROOM TIME - INITIAL BASE CHARGE: Performed by: SURGERY

## 2025-05-01 PROCEDURE — 2500000004 HC RX 250 GENERAL PHARMACY W/ HCPCS (ALT 636 FOR OP/ED): Mod: JZ | Performed by: NURSE ANESTHETIST, CERTIFIED REGISTERED

## 2025-05-01 PROCEDURE — 2500000005 HC RX 250 GENERAL PHARMACY W/O HCPCS: Performed by: NURSE ANESTHETIST, CERTIFIED REGISTERED

## 2025-05-01 PROCEDURE — 3600000003 HC OR TIME - INITIAL BASE CHARGE - PROCEDURE LEVEL THREE: Performed by: SURGERY

## 2025-05-01 PROCEDURE — 2500000005 HC RX 250 GENERAL PHARMACY W/O HCPCS: Mod: JZ | Performed by: SURGERY

## 2025-05-01 PROCEDURE — 88304 TISSUE EXAM BY PATHOLOGIST: CPT | Performed by: PATHOLOGY

## 2025-05-01 PROCEDURE — 2500000005 HC RX 250 GENERAL PHARMACY W/O HCPCS: Performed by: SURGERY

## 2025-05-01 PROCEDURE — 7100000002 HC RECOVERY ROOM TIME - EACH INCREMENTAL 1 MINUTE: Performed by: SURGERY

## 2025-05-01 PROCEDURE — 2500000001 HC RX 250 WO HCPCS SELF ADMINISTERED DRUGS (ALT 637 FOR MEDICARE OP): Performed by: SURGERY

## 2025-05-01 PROCEDURE — 19370 REVJ PERI-IMPLT CAPSULE BRST: CPT | Performed by: SURGERY

## 2025-05-01 PROCEDURE — 2500000002 HC RX 250 W HCPCS SELF ADMINISTERED DRUGS (ALT 637 FOR MEDICARE OP, ALT 636 FOR OP/ED): Performed by: SURGERY

## 2025-05-01 PROCEDURE — 15771 GRFG AUTOL FAT LIPO 50 CC/<: CPT | Performed by: SURGERY

## 2025-05-01 DEVICE — GALASHAPE 3D SCAFFOLD IS A BIORESORBABLE SURGICAL MESH MANUFACTURED FROM POLY-4-HYDROXYBUTYRATE (P4HB).
Type: IMPLANTABLE DEVICE | Site: BREAST | Status: FUNCTIONAL
Brand: GALASHAPE 3D

## 2025-05-01 RX ORDER — SODIUM CHLORIDE, SODIUM LACTATE, POTASSIUM CHLORIDE, CALCIUM CHLORIDE 600; 310; 30; 20 MG/100ML; MG/100ML; MG/100ML; MG/100ML
INJECTION, SOLUTION INTRAVENOUS CONTINUOUS PRN
Status: DISCONTINUED | OUTPATIENT
Start: 2025-05-01 | End: 2025-05-01

## 2025-05-01 RX ORDER — PHENYLEPHRINE HCL IN 0.9% NACL 1 MG/10 ML
SYRINGE (ML) INTRAVENOUS AS NEEDED
Status: DISCONTINUED | OUTPATIENT
Start: 2025-05-01 | End: 2025-05-01

## 2025-05-01 RX ORDER — OXYCODONE HYDROCHLORIDE 5 MG/1
5 TABLET ORAL EVERY 4 HOURS PRN
Status: DISCONTINUED | OUTPATIENT
Start: 2025-05-01 | End: 2025-05-01 | Stop reason: HOSPADM

## 2025-05-01 RX ORDER — GLYCOPYRROLATE 0.2 MG/ML
INJECTION INTRAMUSCULAR; INTRAVENOUS AS NEEDED
Status: DISCONTINUED | OUTPATIENT
Start: 2025-05-01 | End: 2025-05-01

## 2025-05-01 RX ORDER — ACETAMINOPHEN 325 MG/1
975 TABLET ORAL ONCE
Status: DISCONTINUED | OUTPATIENT
Start: 2025-05-01 | End: 2025-05-01 | Stop reason: HOSPADM

## 2025-05-01 RX ORDER — CEFAZOLIN 1 G/1
INJECTION, POWDER, FOR SOLUTION INTRAVENOUS AS NEEDED
Status: DISCONTINUED | OUTPATIENT
Start: 2025-05-01 | End: 2025-05-01

## 2025-05-01 RX ORDER — LIDOCAINE HYDROCHLORIDE 10 MG/ML
0.1 INJECTION, SOLUTION EPIDURAL; INFILTRATION; INTRACAUDAL; PERINEURAL ONCE
Status: DISCONTINUED | OUTPATIENT
Start: 2025-05-01 | End: 2025-05-01 | Stop reason: HOSPADM

## 2025-05-01 RX ORDER — CEFAZOLIN SODIUM 2 G/50ML
2 SOLUTION INTRAVENOUS ONCE
Status: DISCONTINUED | OUTPATIENT
Start: 2025-05-01 | End: 2025-05-01 | Stop reason: HOSPADM

## 2025-05-01 RX ORDER — HYDROMORPHONE HYDROCHLORIDE 1 MG/ML
0.4 INJECTION, SOLUTION INTRAMUSCULAR; INTRAVENOUS; SUBCUTANEOUS EVERY 5 MIN PRN
Status: DISCONTINUED | OUTPATIENT
Start: 2025-05-01 | End: 2025-05-01 | Stop reason: HOSPADM

## 2025-05-01 RX ORDER — KETOROLAC TROMETHAMINE 30 MG/ML
INJECTION, SOLUTION INTRAMUSCULAR; INTRAVENOUS AS NEEDED
Status: DISCONTINUED | OUTPATIENT
Start: 2025-05-01 | End: 2025-05-01

## 2025-05-01 RX ORDER — SCOPOLAMINE 1 MG/3D
PATCH, EXTENDED RELEASE TRANSDERMAL AS NEEDED
Status: DISCONTINUED | OUTPATIENT
Start: 2025-05-01 | End: 2025-05-01

## 2025-05-01 RX ORDER — SODIUM CHLORIDE 0.9 G/100ML
INJECTION, SOLUTION IRRIGATION AS NEEDED
Status: DISCONTINUED | OUTPATIENT
Start: 2025-05-01 | End: 2025-05-01 | Stop reason: HOSPADM

## 2025-05-01 RX ORDER — ONDANSETRON 4 MG/1
4 TABLET, FILM COATED ORAL EVERY 8 HOURS PRN
Qty: 10 TABLET | Refills: 0 | Status: SHIPPED | OUTPATIENT
Start: 2025-05-01

## 2025-05-01 RX ORDER — ACETAMINOPHEN 10 MG/ML
INJECTION, SOLUTION INTRAVENOUS AS NEEDED
Status: DISCONTINUED | OUTPATIENT
Start: 2025-05-01 | End: 2025-05-01

## 2025-05-01 RX ORDER — ONDANSETRON HYDROCHLORIDE 2 MG/ML
4 INJECTION, SOLUTION INTRAVENOUS ONCE AS NEEDED
Status: DISCONTINUED | OUTPATIENT
Start: 2025-05-01 | End: 2025-05-01 | Stop reason: HOSPADM

## 2025-05-01 RX ORDER — PROPOFOL 10 MG/ML
INJECTION, EMULSION INTRAVENOUS AS NEEDED
Status: DISCONTINUED | OUTPATIENT
Start: 2025-05-01 | End: 2025-05-01

## 2025-05-01 RX ORDER — LIDOCAINE HYDROCHLORIDE 20 MG/ML
INJECTION, SOLUTION INFILTRATION; PERINEURAL AS NEEDED
Status: DISCONTINUED | OUTPATIENT
Start: 2025-05-01 | End: 2025-05-01

## 2025-05-01 RX ORDER — APREPITANT 40 MG/1
40 CAPSULE ORAL ONCE
Status: COMPLETED | OUTPATIENT
Start: 2025-05-01 | End: 2025-05-01

## 2025-05-01 RX ORDER — MUPIROCIN 20 MG/G
OINTMENT TOPICAL AS NEEDED
Status: DISCONTINUED | OUTPATIENT
Start: 2025-05-01 | End: 2025-05-01 | Stop reason: HOSPADM

## 2025-05-01 RX ORDER — NORETHINDRONE AND ETHINYL ESTRADIOL 0.5-0.035
KIT ORAL AS NEEDED
Status: DISCONTINUED | OUTPATIENT
Start: 2025-05-01 | End: 2025-05-01

## 2025-05-01 RX ORDER — ONDANSETRON HYDROCHLORIDE 2 MG/ML
INJECTION, SOLUTION INTRAVENOUS AS NEEDED
Status: DISCONTINUED | OUTPATIENT
Start: 2025-05-01 | End: 2025-05-01

## 2025-05-01 RX ORDER — EPINEPHRINE 1 MG/ML
INJECTION, SOLUTION, CONCENTRATE INTRAVENOUS AS NEEDED
Status: DISCONTINUED | OUTPATIENT
Start: 2025-05-01 | End: 2025-05-01 | Stop reason: HOSPADM

## 2025-05-01 RX ORDER — MIDAZOLAM HYDROCHLORIDE 1 MG/ML
INJECTION, SOLUTION INTRAMUSCULAR; INTRAVENOUS AS NEEDED
Status: DISCONTINUED | OUTPATIENT
Start: 2025-05-01 | End: 2025-05-01

## 2025-05-01 RX ORDER — METOCLOPRAMIDE HYDROCHLORIDE 5 MG/ML
10 INJECTION INTRAMUSCULAR; INTRAVENOUS ONCE AS NEEDED
Status: DISCONTINUED | OUTPATIENT
Start: 2025-05-01 | End: 2025-05-01 | Stop reason: HOSPADM

## 2025-05-01 RX ORDER — ALBUTEROL SULFATE 0.83 MG/ML
2.5 SOLUTION RESPIRATORY (INHALATION) ONCE AS NEEDED
Status: DISCONTINUED | OUTPATIENT
Start: 2025-05-01 | End: 2025-05-01 | Stop reason: HOSPADM

## 2025-05-01 RX ORDER — SODIUM CHLORIDE, SODIUM LACTATE, POTASSIUM CHLORIDE, CALCIUM CHLORIDE 600; 310; 30; 20 MG/100ML; MG/100ML; MG/100ML; MG/100ML
100 INJECTION, SOLUTION INTRAVENOUS CONTINUOUS
Status: SHIPPED | OUTPATIENT
Start: 2025-05-01 | End: 2025-05-01

## 2025-05-01 RX ORDER — GABAPENTIN 300 MG/1
600 CAPSULE ORAL ONCE
Status: COMPLETED | OUTPATIENT
Start: 2025-05-01 | End: 2025-05-01

## 2025-05-01 RX ORDER — METHOCARBAMOL 100 MG/ML
INJECTION, SOLUTION INTRAMUSCULAR; INTRAVENOUS AS NEEDED
Status: DISCONTINUED | OUTPATIENT
Start: 2025-05-01 | End: 2025-05-01

## 2025-05-01 RX ORDER — HYDROMORPHONE HYDROCHLORIDE 1 MG/ML
0.2 INJECTION, SOLUTION INTRAMUSCULAR; INTRAVENOUS; SUBCUTANEOUS EVERY 5 MIN PRN
Status: DISCONTINUED | OUTPATIENT
Start: 2025-05-01 | End: 2025-05-01 | Stop reason: HOSPADM

## 2025-05-01 RX ORDER — FENTANYL CITRATE 50 UG/ML
INJECTION, SOLUTION INTRAMUSCULAR; INTRAVENOUS AS NEEDED
Status: DISCONTINUED | OUTPATIENT
Start: 2025-05-01 | End: 2025-05-01

## 2025-05-01 RX ORDER — HYDROMORPHONE HYDROCHLORIDE 1 MG/ML
INJECTION, SOLUTION INTRAMUSCULAR; INTRAVENOUS; SUBCUTANEOUS AS NEEDED
Status: DISCONTINUED | OUTPATIENT
Start: 2025-05-01 | End: 2025-05-01

## 2025-05-01 RX ORDER — LIDOCAINE HYDROCHLORIDE 20 MG/ML
INJECTION, SOLUTION INFILTRATION; PERINEURAL AS NEEDED
Status: DISCONTINUED | OUTPATIENT
Start: 2025-05-01 | End: 2025-05-01 | Stop reason: HOSPADM

## 2025-05-01 RX ADMIN — HYDROMORPHONE HYDROCHLORIDE 0.2 MG: 1 INJECTION, SOLUTION INTRAMUSCULAR; INTRAVENOUS; SUBCUTANEOUS at 12:04

## 2025-05-01 RX ADMIN — ACETAMINOPHEN 1000 MG: 10 INJECTION, SOLUTION INTRAVENOUS at 08:19

## 2025-05-01 RX ADMIN — Medication 160 MCG: at 09:20

## 2025-05-01 RX ADMIN — EPHEDRINE SULFATE 25 MG: 50 INJECTION, SOLUTION INTRAVENOUS at 09:28

## 2025-05-01 RX ADMIN — PROPOFOL 50 MCG/KG/MIN: 10 INJECTION, EMULSION INTRAVENOUS at 07:59

## 2025-05-01 RX ADMIN — HYDROMORPHONE HYDROCHLORIDE 0.2 MG: 1 INJECTION, SOLUTION INTRAMUSCULAR; INTRAVENOUS; SUBCUTANEOUS at 10:25

## 2025-05-01 RX ADMIN — PROPOFOL 200 MG: 10 INJECTION, EMULSION INTRAVENOUS at 07:52

## 2025-05-01 RX ADMIN — CEFAZOLIN 2 G: 330 INJECTION, POWDER, FOR SOLUTION INTRAMUSCULAR; INTRAVENOUS at 11:59

## 2025-05-01 RX ADMIN — Medication 120 MCG: at 08:07

## 2025-05-01 RX ADMIN — MIDAZOLAM 2 MG: 1 INJECTION INTRAMUSCULAR; INTRAVENOUS at 07:44

## 2025-05-01 RX ADMIN — CEFAZOLIN 2 G: 330 INJECTION, POWDER, FOR SOLUTION INTRAMUSCULAR; INTRAVENOUS at 08:01

## 2025-05-01 RX ADMIN — ONDANSETRON 4 MG: 2 INJECTION INTRAMUSCULAR; INTRAVENOUS at 12:52

## 2025-05-01 RX ADMIN — FENTANYL CITRATE 100 MCG: 50 INJECTION, SOLUTION INTRAMUSCULAR; INTRAVENOUS at 07:51

## 2025-05-01 RX ADMIN — Medication 120 MCG: at 09:02

## 2025-05-01 RX ADMIN — GLYCOPYRROLATE 0.2 MG: 0.2 INJECTION, SOLUTION INTRAMUSCULAR; INTRAVENOUS at 07:44

## 2025-05-01 RX ADMIN — SODIUM CHLORIDE, POTASSIUM CHLORIDE, SODIUM LACTATE AND CALCIUM CHLORIDE: 600; 310; 30; 20 INJECTION, SOLUTION INTRAVENOUS at 07:34

## 2025-05-01 RX ADMIN — DEXAMETHASONE SODIUM PHOSPHATE 4 MG: 4 INJECTION, SOLUTION INTRA-ARTICULAR; INTRALESIONAL; INTRAMUSCULAR; INTRAVENOUS; SOFT TISSUE at 08:17

## 2025-05-01 RX ADMIN — SCOPOLAMINE 1 PATCH: 1.5 PATCH, EXTENDED RELEASE TRANSDERMAL at 07:40

## 2025-05-01 RX ADMIN — KETOROLAC TROMETHAMINE 30 MG: 30 INJECTION, SOLUTION INTRAMUSCULAR; INTRAVENOUS at 13:05

## 2025-05-01 RX ADMIN — Medication 80 MCG: at 07:56

## 2025-05-01 RX ADMIN — NORETHINDRONE AND ETHINYL ESTRADIOL 15 MG: KIT ORAL at 09:59

## 2025-05-01 RX ADMIN — APREPITANT 40 MG: 40 CAPSULE ORAL at 07:33

## 2025-05-01 RX ADMIN — GABAPENTIN 300 MG: 300 CAPSULE ORAL at 07:33

## 2025-05-01 RX ADMIN — HYDROMORPHONE HYDROCHLORIDE 0.2 MG: 1 INJECTION, SOLUTION INTRAMUSCULAR; INTRAVENOUS; SUBCUTANEOUS at 10:40

## 2025-05-01 RX ADMIN — OXYCODONE HYDROCHLORIDE 5 MG: 5 TABLET ORAL at 14:13

## 2025-05-01 RX ADMIN — Medication 120 MCG: at 08:13

## 2025-05-01 RX ADMIN — METHOCARBAMOL 1000 MG: 100 INJECTION INTRAMUSCULAR; INTRAVENOUS at 10:37

## 2025-05-01 RX ADMIN — Medication 120 MCG: at 08:49

## 2025-05-01 RX ADMIN — LIDOCAINE HYDROCHLORIDE 4 ML: 20 INJECTION, SOLUTION INFILTRATION; PERINEURAL at 07:51

## 2025-05-01 RX ADMIN — NORETHINDRONE AND ETHINYL ESTRADIOL 10 MG: KIT ORAL at 09:29

## 2025-05-01 SDOH — HEALTH STABILITY: MENTAL HEALTH: CURRENT SMOKER: 0

## 2025-05-01 ASSESSMENT — PAIN - FUNCTIONAL ASSESSMENT
PAIN_FUNCTIONAL_ASSESSMENT: 0-10

## 2025-05-01 ASSESSMENT — COLUMBIA-SUICIDE SEVERITY RATING SCALE - C-SSRS
6. HAVE YOU EVER DONE ANYTHING, STARTED TO DO ANYTHING, OR PREPARED TO DO ANYTHING TO END YOUR LIFE?: NO
2. HAVE YOU ACTUALLY HAD ANY THOUGHTS OF KILLING YOURSELF?: NO
1. IN THE PAST MONTH, HAVE YOU WISHED YOU WERE DEAD OR WISHED YOU COULD GO TO SLEEP AND NOT WAKE UP?: NO

## 2025-05-01 ASSESSMENT — PAIN SCALES - GENERAL
PAINLEVEL_OUTOF10: 0 - NO PAIN
PAIN_LEVEL: 0
PAINLEVEL_OUTOF10: 3
PAINLEVEL_OUTOF10: 0 - NO PAIN
PAINLEVEL_OUTOF10: 3
PAINLEVEL_OUTOF10: 0 - NO PAIN
PAINLEVEL_OUTOF10: 3

## 2025-05-01 NOTE — INTERVAL H&P NOTE
H&P reviewed. The patient was examined and there are no changes to the H&P.    Patient to OR today for left breast reconstruction revision with fat grafting, right revision of reconstructed breast with implant exchange in the prepectoral plane and placement of biologic mesh with Dr. Chiu.    - History and physical/Consult note from 4/8 reviewed and with no changes.  - The risks, benefits, indications, contraindications, and surgical alternatives were explained to the patient. Specifically, the risks of surgery, including bleeding, infection, injury to nearby organs or structures, need for additional surgery or procedures, wound complications, and complications of general anesthesia.  Commonly associated risks of the procedure were also discussed with the patient in detail. The patient participated in the discussion and was given an opportunity to ask questions. All questions were answered to the patient's verbal satisfaction.    - Written and informed consent signed and is located in the patient's chart.   - NPO since midnight.    Jamal Vegas MD  PGY-2 General Surgery

## 2025-05-01 NOTE — ANESTHESIA PREPROCEDURE EVALUATION
Patient: Julieta Lagunas    Procedure Information       Date/Time: 05/01/25 0715    Procedures:       REVISION, RECONSTRUCTION, BREAST, WITH IMPLANT INSERTION (Right: Breast) - I think this case will take 4-5 hours      INJECTION, FAT GRAFT, BREAST (Left: Breast)    Location: [facility] OR 04 / Virtual [facility] OR    Surgeons: Liz Chiu MD            Relevant Problems   No relevant active problems       Clinical information reviewed:   Tobacco  Allergies  Meds   Med Hx  Surg Hx   Fam Hx  Soc Hx        NPO Detail:  NPO/Void Status  Date of Last Liquid: 05/01/25  Time of Last Liquid: 0500 (with morning meds)  Date of Last Solid: 04/30/25  Time of Last Solid: 1900         Physical Exam    Airway  Mallampati: II  TM distance: <3 FB  Neck ROM: full  Mouth opening: 3 or more finger widths     Cardiovascular - normal exam  Rhythm: regular  Rate: normal     Dental - normal exam     Pulmonary - normal exam   Abdominal            Anesthesia Plan    History of general anesthesia?: yes  History of complications of general anesthesia?: no    ASA 3     general     The patient is not a current smoker.    intravenous induction   Anesthetic plan and risks discussed with patient and spouse.

## 2025-05-01 NOTE — ANESTHESIA POSTPROCEDURE EVALUATION
Patient: Julieta Lagunas    Procedure Summary       Date: 05/01/25 Room / Location: [facility] 04 / [facility]    Anesthesia Start: 0746 Anesthesia Stop: 1332    Procedures:       REVISION, RECONSTRUCTION, BREAST, WITH IMPLANT INSERTION (Right: Breast)      INJECTION, FAT GRAFT, BREAST (Left: Breast) Diagnosis:       Capsular contracture of breast implant, sequela      Breast asymmetry following reconstructive surgery      (Capsular contracture of breast implant, sequela [T85.44XS])      (Breast asymmetry following reconstructive surgery [N65.1])    Surgeons: Liz Chiu MD Responsible Provider: Won Johnson MD    Anesthesia Type: general ASA Status: 3            Anesthesia Type: general    Vitals Value Taken Time   BP 96/86 05/01/25 13:29   Temp 36 °C (96.8 °F) 05/01/25 13:29   Pulse 91 05/01/25 13:29   Resp 17 05/01/25 13:29   SpO2 97 % 05/01/25 13:29       Anesthesia Post Evaluation    Patient location during evaluation: PACU  Patient participation: complete - patient participated  Level of consciousness: awake and alert  Pain score: 0  Pain management: adequate  Airway patency: patent  Cardiovascular status: acceptable  Respiratory status: acceptable  Hydration status: acceptable  Postoperative Nausea and Vomiting: none        There were no known notable events for this encounter.

## 2025-05-01 NOTE — ANESTHESIA PROCEDURE NOTES
Airway  Date/Time: 5/1/2025 7:54 AM  Reason: elective    Airway not difficult    Staffing  Performed: CRNA   Authorized by: Won Johnson MD    Performed by: TRISHA Beaver-CLARA  Patient location during procedure: OR    Patient Condition  Indications for airway management: anesthesia  Patient position: sniffing  Planned trial extubation  Sedation level: deep     Final Airway Details   Preoxygenated: yes  Final airway type: supraglottic airway  Successful airway: Supraglottic airway: igel.  Size: 3  Number of attempts at approach: 1

## 2025-05-01 NOTE — BRIEF OP NOTE
Date: 2025  OR Location: [facility] [place] [facility]    Name: Julieta Lagunas, : 1965, Age: 59 y.o., MRN: [id number], Sex: female    Diagnosis  Pre-op Diagnosis      * Capsular contracture of breast implant, sequela [T85.44XS]     * Breast asymmetry following reconstructive surgery [N65.1] Post-op Diagnosis     * Capsular contracture of breast implant, sequela [T85.44XS]     * Breast asymmetry following reconstructive surgery [N65.1]     Procedures  REVISION, RECONSTRUCTION, BREAST, WITH IMPLANT INSERTION  41834 - NV INSJ/RPLCMT BREAST IMPLANT SEP DAY MASTECTOMY    REVISION, RECONSTRUCTION, BREAST, WITH IMPLANT INSERTION  53593 - NV REVISION OF RECONSTRUCTED BREAST    REVISION, RECONSTRUCTION, BREAST, WITH IMPLANT INSERTION  43021 - NV REVISION EZE-IMPLANT CAPSULE BREAST    REVISION, RECONSTRUCTION, BREAST, WITH IMPLANT INSERTION  00739 - NV IMPLNT BIO IMPLNT FOR SOFT TISSUE REINFORCEMENT    INJECTION, FAT GRAFT, BREAST  75571 - NV GRAFTING OF AUTOLOGOUS FAT BY LIPO 50 CC OR LESS    INJECTION, FAT GRAFT, BREAST  77123 - NV REVISION OF RECONSTRUCTED BREAST      Surgeons      * Liz Chiu - Primary    Resident/Fellow/Other Assistant:  Surgeons and Role:  * No surgeons found with a matching role *    Staff:   Surgical Assistant:   Circulator: Omayra Alcazar Person: Arthur Robles Scrub: Josefa    Anesthesia Staff: Anesthesiologist: Won Johnson MD  CRNA: TRISHA Beaver-CLARA    Procedure Summary  Anesthesia: General  ASA: III  Estimated Blood Loss: 30mL  Intra-op Medications:   Administrations occurring from 0715 to 1315 on 25:   Medication Name Total Dose   sodium chloride 0.9 % irrigation solution 1,000 mL   EPINEPHrine HCl (PF) (Adrenalin) injection 1 mg   mupirocin (Bactroban) 2 % ointment 1 Application   sodium chloride 0.9 % irrigation solution 1,000 mL   lidocaine (Xylocaine) 20 mg/mL (2 %) injection 20 mL   bupivacaine liposome (Exparel) 1.3 % (13.3 mg/mL) 20 mL, bupivacaine PF (Marcaine)  0.5 % (5 mg/mL) 20 mL, sodium chloride (PF) 0.9% 20 mL syringe 120 mL   aprepitant (Emend) capsule 40 mg 40 mg   gabapentin (Neurontin) capsule 600 mg 300 mg   acetaminophen (Ofirmev) injection 1,000 mg   ceFAZolin (Ancef) 1 g 4 g   dexAMETHasone (Decadron) 4 mg/mL IV Syringe 2 mL 4 mg   ePHEDrine 50 mg/mL 25 mg   ePHEDrine injection 25 mg   fentaNYL PF 0.05 mg/mL 100 mcg   glycopyrrolate (Robinul) injection 0.2 mg   HYDROmorphone (Dilaudid) 1 mg/mL injection 0.6 mg   ketorolac (Toradol) injection 30 mg 30 mg   LR infusion Cannot be calculated   lidocaine (Xylocaine) 2 % 4 mL   methocarbamol (Robaxin) 100 mg/mL 1,000 mg   midazolam (Versed) 1 mg/1 mL 2 mg   ondansetron (Zofran) 2 mg/mL injection 4 mg   phenylephrine 100 mcg/mL syringe 10 mL (prefilled) 720 mcg   propofol (Diprivan) injection 10 mg/mL 1,200.01 mg   scopolamine patch 1 patch              Anesthesia Record               Intraprocedure I/O Totals          Intake    LR infusion 800.00 mL    Total Intake 800 mL       Output    Urine 200 mL    Est. Blood Loss 20 mL    Total Output 220 mL       Net    Net Volume 580 mL          Specimen:   ID Type Source Tests Collected by Time   1 : right breast capsule Tissue BREAST CAPSULE RIGHT SURGICAL PATHOLOGY EXAM Liz Chiu MD 5/1/2025 0900                  Findings: Left breast fat grafted with 149cc of fat from bilateral axillae and chest wall; revised construction for more projection. Right breast implant and capsule removed; 650cc implant replaced with mesh sling. Drain left in right breast pocket.    Complications:  None; patient tolerated the procedure well.     Disposition: PACU - hemodynamically stable.  Condition: stable  Specimens Collected:   ID Type Source Tests Collected by Time   1 : right breast capsule Tissue BREAST CAPSULE RIGHT SURGICAL PATHOLOGY EXAM Liz Chiu MD 5/1/2025 0900     Attending Attestation:     Liz Chiu  Phone Number: [phone]

## 2025-05-01 NOTE — OP NOTE
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    10574 - PA INSJ/RPLCMT BREAST IMPLANT SEP DAY MASTECTOMY  01962 - PA REVISION EZE-IMPLANT CAPSULE BREAST  15593 - Removal of intact implant  23849 - PA IMPLNT BIO IMPLNT FOR SOFT TISSUE REINFORCEMENT    INJECTION, FAT GRAFT, BREAST 149 cc  97447 - PA GRAFTING OF AUTOLOGOUS FAT BY LIPO 50 CC OR LESS  15772 x 2    Surgeons      * Liz Chiu - Primary    Resident/Fellow/Other Assistant:  Surgeons and Role:  Jamal Vegas MD Gen Surg PGY 2    Staff:   Surgical Assistant:   Carissaulator: Omayra Alcazar Person: Arthur Robles Scrub: Josefa    Anesthesia Staff: Anesthesiologist: Won Johnson MD  CRNA: TRISHA Baever-CLARA    Procedure Summary  Anesthesia: General  ASA: III  Estimated Blood Loss: 25 mL  Intra-op Medications:   Administrations occurring from 0715 to 1315 on 25:   Medication Name Total Dose   sodium chloride 0.9 % irrigation solution 1,000 mL   EPINEPHrine HCl (PF) (Adrenalin) injection 1 mg   mupirocin (Bactroban) 2 % ointment 1 Application   sodium chloride 0.9 % irrigation solution 1,000 mL   lidocaine (Xylocaine) 20 mg/mL (2 %) injection 20 mL   bupivacaine liposome (Exparel) 1.3 % (13.3 mg/mL) 20 mL, bupivacaine PF (Marcaine) 0.5 % (5 mg/mL) 20 mL, sodium chloride (PF)  0.9% 20 mL syringe 120 mL   aprepitant (Emend) capsule 40 mg 40 mg   gabapentin (Neurontin) capsule 600 mg 300 mg   acetaminophen (Ofirmev) injection 1,000 mg   ceFAZolin (Ancef) 1 g 4 g   dexAMETHasone (Decadron) 4 mg/mL IV Syringe 2 mL 4 mg   ePHEDrine 50 mg/mL 25 mg   ePHEDrine injection 25 mg   fentaNYL PF 0.05 mg/mL 100 mcg   glycopyrrolate (Robinul) injection 0.2 mg   HYDROmorphone (Dilaudid) 1 mg/mL injection 0.6 mg   ketorolac (Toradol) injection 30 mg 30 mg   LR infusion Cannot be calculated   lidocaine (Xylocaine) 2 % 4 mL   methocarbamol (Robaxin) 100 mg/mL 1,000 mg   midazolam (Versed) 1 mg/1 mL 2 mg   ondansetron (Zofran) 2 mg/mL injection 4 mg   phenylephrine 100 mcg/mL syringe 10 mL (prefilled) 720 mcg   propofol (Diprivan) injection 10 mg/mL 1,200.01 mg   scopolamine patch 1 patch              Anesthesia Record               Intraprocedure I/O Totals          Intake    LR infusion 800.00 mL    Total Intake 800 mL       Output    Urine 200 mL    Est. Blood Loss 20 mL    Total Output 220 mL       Net    Net Volume 580 mL          Specimen:   ID Type Source Tests Collected by Time   1 : right breast capsule Tissue BREAST CAPSULE RIGHT SURGICAL PATHOLOGY EXAM Liz Chiu MD 5/1/2025 0900        Drains and/or Catheters:   Closed/Suction Drain Inferior;Lateral;Right Breast 19 Fr. (Active)   Site Description Bleeding 05/01/25 1341   Dressing Status Clean;Dry 05/01/25 1341   Drainage Appearance Serosanguineous 05/01/25 1341   Status To bulb suction 05/01/25 1341       Tourniquet Times:         Implants:  Implants       Type Name Action Serial No.       7.5CM X 21.0CM GALASHAPE 3D SCAFFOLD, OVAL SHAPE Implanted      Breast Implant RIGHT BREAST IMPLANT 650CC MENTOR Implanted [id number]            Indications: Julieta Lagunas is an 59 y.o. 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  She has a of bile  mastectomy.  She had radiation on the left and that side has been reconstructed with autologous tissue from her abdomen.  The right side has a subpectoral implant.  She has significant asymmetry.  She has animation deformity on the right side where lateral displacement of her implant and effacement of her inframammary fold.  On the left side the flap is laterally displaced with poor definition of the inframammary fold and lacks projection.  She will have her right breast reconstruction revised with a change the plane from the subpectoral to prepectoral space with exchange of implants, partial capsulectomy, recreation of her inframammary fold with capsulorrhaphy, and support of her implant with biologic mesh.  The left reconstructed breast will also be revised including fat grafting.    The patient was seen in the preoperative area. The risks, benefits, complications, treatment options, non-operative alternatives, expected recovery and outcomes were discussed with the patient. The risks of the procedure were discussed with her prior to surgery.  These include but are not limited to the risks of anesthesia, infection, bleeding, pain, need for further procedures, hematoma, seroma, wound healing complications, and asymmetry.  Fat necrosis, resorption, calcifications, oil cytsts, need for imaging/biopsy.  The possibilities of reaction to medication, pulmonary aspiration, injury to surrounding structures, bleeding, recurrent infection, the need for additional procedures, failure to diagnose a condition, and creating a complication requiring transfusion or operation were discussed with the patient. The patient concurred with the proposed plan, giving informed consent.  The site of surgery was properly noted/marked if necessary per policy. The patient has been actively warmed in preoperative area. Preoperative antibiotics have been ordered and given within 1 hours of incision. Venous thrombosis prophylaxis have been ordered  including bilateral sequential compression devices    Procedure Details: The patient was identified and marked in the upright and standing position.  She was taken to the operative room and placed in the supine position.  A preoperative time was performed identifying the patient, procedeure and laterality.  An atraumatic intubation was initiated with LMA by the anesthesia team.  Her arms were padded and carefully secured to the arm boards, abducted less than 90 degrees. She was prepped and draped in the usual sterile fashion.    I started first on the right breast.  I incised her transverse mastectomy incision.  I dissected down to her capsule and raised her superior mastectomy flap off the capsule and pectoralis to create the superior portion of the new pocket.  I then carefully dissected the pectoralis off of the superior capsule.  I then incised the anterior capsule approximately 3 cm superior to the inferior mastectomy flap.  I carefully removed the posterior capsule off the chest wall for the upper two thirds of it.  Pathology.  I removed her intact implant.  This was the smooth round 600 cc Allergan implant.  I then placed the pectoralis muscle under as much stretch as possible inferiorly and sutured it to the chest wall using interrupted 2-0 Vicryl figure-of-eight's.  Next I dissected the inferior medial pocket further inferiorly to create a proper medial portion of the inframammary fold.  I also released the inferior edge of the capsule from the inframammary fold where it had contracted.  I then used multiple interrupted 2-0 Vicryl figure-of-eight's to recreate a defined inframammary fold.  I used the gel sizer to assess and decided to use a 650 high-profile sizer.  I used the sizers to also confirmed placement of the new fold.  In order to minimize superior pole rippling now that the reconstruction will be in the prepectoral space, I decided to place a inferior pole sling to help support the implant.  I  used a large piece of biologic mesh (gala flex) and secured it to the new inframammary fold.  I then used a running locking 0 Prolene to reinforce the capsulorrhaphy and the placement of the inferior edge of the mesh.  Using the sizer to confirm the superior edge of the placement of the mesh, I then secured the suture here edge of the mesh to the residual capsule of the inferior pole.  Any capsule that was on the inferior mastectomy flap overlapping the mesh was carefully removed to allow for good integration.  The residual capsule of the inferior mastectomy flap was also split vertically as well as in the transverse manner to allow for proper draping of the mastectomies given over the new implant.  I obtained hemostasis.  I filled the pocket with Irrisept and let it soak while I turned my attention to the left reconstructed breast.    I had originally planned to reshape the flap by skin fat graft from the inferior and lateral aspect of the flap and placing this in the superior medial pole.  However now the flap also required more projection for better symmetry.  Therefore I marked the transverse excess around a central vertical ellipse in the lower.  I de-epithelialized this.  This measured approximately 9 x 4-1/2 cm.  I then's split the flap and imbricated the soft tissue together similar to a vertical mastopexy using interrupted 2-0 Vicryl.  I then closed the incision with interrupted 3-0 Monocryl's buried dermals. I placed tumescence in the right lateral chest wall as well as left lateral chest wall, the lateral aspect of the left flap as well as inframammary fold and left superior abdomen where she had fullness.  Tumescent had time to take effect, I used a 4 mm Mercedes cannula and power assisted suction lipectomy to harvest the fat graft from her bilateral chest wall well as left lateral and inferior flap to recreate and define a new inframammary fold.  I then processed the fat graft using PureGraft.  It was  washed with normal saline and placed in 10 cc syringes.    I then turned my attention back to the right breast.  I evacuated the Irrisept from it.  Lipo aspirate from her lateral chest wall was also in the pocket from the lateral chest wall fat graft harvesting.  Therefore I placed a 19 Setswana Idris drain laterally.  Lay in the lateral aspect of the pocket and into the early between the gala flex and lower mastectomy skin.  I once more obtained hemostasis.  I then prepared a dilution of 20 cc of Exparel with 30 cc of half percent Marcaine plain and 70 cc of normal saline to use as a local block for postoperative pain control.  I used 20 cc of this to place it directly along the chest wall where the pectoralis had been and the new inframammary fold created.  I prepped the site with Irrisept and laid down sterile blue towels.  I changed into new sterile gloves.  I opened up a 650 cc [place] smooth round high-profile gel implant and bathed it with Irrisept.  I placed the implant into the pocket using a Aguirre funnel.  I then closed the subcutaneous tissue with interrupted buried 3-0 Monocryl.  I then freshened her incision by excising her previous curved iris scissors and closed the incision using 3-0 Monocryl's interrupted buried dermals followed by running 4-0 Monocryl subcuticular.    I now turned my attention back to the left side to place my fat graft with the patient sitting upright for symmetry.  Using Spencer catheters and placing the fat graft in micro aliquots in a three-dimensional manner, I placed the graft in the following poles:    Superior: 20 cc  Superior Medial: 50 cc  Medial 59 cc  Central: 20 ml  Total:  149 ml    I finished closing the left incision with a 4-0 Monocryl running subcuticular.  I then placed the remainder of the Exparel dilution in bilateral chest walls for postoperative pain control.  The incisions were dressed with SYLKE.  I placed a CHG Tegaderm dressing over the drain.  I placed  Tegaderm over the right breast reconstruction to keep the inferior mastectomy skin opposed to the implant and mesh structure.   Epifoam was then placed carefully to hold the right implant in the proper position as well as place compression along both lateral chest wall and new inframammary folds.  The padding was also placed to keep pressure off of the fat graft areas.  This was all secured using 6 inch Ace wraps.    The patient tolerated the procedure well.  She was woken up, the LMA was removed, and she was taken to the recovery room in good condition.    Directly      Evidence of Infection: No   Complications:  None; patient tolerated the procedure well.    Disposition: PACU - hemodynamically stable.  Condition: stable     Attending Attestation: I performed the procedure.    Liz Chiu  Phone Number: [phone]

## 2025-05-01 NOTE — DISCHARGE INSTRUCTIONS
Plastic Surgery Post Discharge Instructions  Activity:  Light activity. No pushing, pulling or lifting objects greater than 5 pounds in either arm. Please do not apply direct heat or cold to your incisions without a barrier    You may locally bathe following discharge. Avoid soaking or submerging surgical incisions/sites or wetting dressings.      You have been prescribed antibiotics. Please complete these antibiotics as prescribed.    Surgical Site/Wound Care:  Your incisions are covered with sylke tape, tegaderm, foam, and ACE bandages. Please do not remove your dressings until seen in clinic postoperatively. You may bathe locally. Should your dressings begin to loosen, please call our clinic so we can work to readjust your dressings.     You may bathe locally using sponges. Do not shower until your follow up appointment. Avoid application of creams, lotions or ointments to surgical site, no soaking or scrubbing of surgical sites.     Continue to monitor your incisions for changes in general appearance, color, temperature and turgor. Please additionally monitor for any developing signs of infection which may include increased redness, swelling, fever/chills, green/yellow drainage, or foul odor from surgical sites or wounds.     Drain care:  You are being discharged with a drain in your right chest wall. To empty the drain, open the cap, tip into cup and squeeze to empty. Squeeze drain flat then replace the cap.  Please empty the drain and record its output 3 times a day and bring these numbers to your follow up appointment.  The drain output should decrease and the color of the drainage should become lighter (red to pink to yellow).  This drain is sutured into place.  Keep the area around the drain clean and dry.  Call the office if you notice drastic changes in drain output, bloody drain output, or redness/drainage around insertion site.    Nutrition:  You may resume a regular diet following surgery with  increased protein. Ensure that you are drinking an adequate amount of fluids to maintain hydration as well as consuming a diet high in protein and low in sugar. You may consider increasing your fiber intake to avoid constipation.    Do not smoke, as smoking delays healing and increases the risk of complications. Also be sure you are not around people that smoke for at least 6 weeks after surgery.  Second hand smoke is just as harmful as if you were to smoke.    Medication Instructions:  You may resume use of your home prescribed mediations as previously directed following discharge from the hospital. If you were taking medications prior to your surgery and they are not listed on your discharge homegoing instructions medications list, consult your MD before you resume these medications.    Some postoperative pain is not unusual. You have been prescribed gabapentin, celebrex, and tylenol for postoperative pain control. You also received targeted anesthetic blocks intraoperatively. Please take these medications as scheduled.    Remember when taking Acetaminophen, do NOT exceed more than 1000 milligrams (mg) per dose or more than 4000 mg total per day. Taking too much Acetaminophen at one time can damage your liver.     DO NOT drive a car while utilizing narcotic pain medications and until cleared by MD at follow-up appointment. Driving or operating heavy machinery, lawnmowers or power tools while taking opiod/narcotic pain medications may impair your judgement.    Call Physician If:  Contact the plastic surgery office for any questions and/or concerns regarding the surgical incision/site.  1. [phone] if Monday-Friday (8 a.m. - 4:30 p.m.)  2. [phone] and ask for the Plastic Surgery team on call provider if after hours or on weekends  3. Email [email or web address] for any non-urgent concerns.    Call your MD or seek immediate medical attention if you experience any of the following symptoms:  1.  Fever of 101.5 (38.5 C) or greater  2. Pain not controlled with prescribed pain medications  3. Uncontrolled nausea and/or vomiting  4. Drainage or swelling around your incisions and/or surgical sites   5. Separation of incisions, or tearing of the incision line  6. Large fluid collection under or around the incision or flap sites   7. Flap discoloration (including darkened appearance)  8. Difficulty breathing  9. Swelling, pain, heat and/or redness in your legs and/or calves  10. Inability to tolerate diet/fluid intake    Follow-up/Post Discharge Appointments:  Follow-up care is a key part of your treatment and safety. It is very important that you maintain follow-up care as directed so that your surgical site heals properly and does not lead to problems. Always carry a current medication list with you and bring it to ALL healthcare Provider visits. Be sure to maintain follow up with plastic surgery at your scheduled appointment. If you are unable to keep your appointment, or need to reschedule please contact our office at [phone].     You will follow up with Dr. Chiu on 5/13.      Medications prescribed:  -Celebrex 200 mg BID with meals  -Gabapentin 600 mg Q8H  -Tylenol 1000 mg Q8H  -BactrimDS BID x 7 days postoperatively (5/1-5/8)May have Tylenol after: 215pm    May have Ibuprofen/advil/motrin/aleve after: 7pm.   Scopalamine patch may stay on for 72 hrs.  Remove patch, discard away form pets, children.  Do not touch eyes!  Wash hands thoroughly .

## 2025-05-01 NOTE — PERIOPERATIVE NURSING NOTE
Patient is A&O x4, VSS, respers even and unlabored. Discharge instructions, follow up, and medications reviewed. Questions answered, patient and family verbalized understanding.  OTTO Gonzalez RN at bedside with discharge instructions. , Antoni at bedside.

## 2025-05-04 ENCOUNTER — TELEPHONE (OUTPATIENT)
Dept: PLASTIC SURGERY | Facility: HOSPITAL | Age: 60
End: 2025-05-04
Payer: COMMERCIAL

## 2025-05-04 NOTE — TELEPHONE ENCOUNTER
Julieta Lagunas is a 59 year old female who is s/p revision, reconstruction of right breast with implant inseriton, and fat graft injection to left breast on 5/1 with Dr. Chiu. She is calling today stating she has had persistent leaking from right side under ACE wrap. She states it appears to be draining from the drain insertion site, however was told not to remove ACE wrap, so unsure of where it is actually coming from. She states she has changed her shirt 3-4 times today and is calling to make sure this is ok. She states the drainage is bloody in nature, denies any increased pain, fever, chills, change in drainage color. She sent us photos through [employer] (See photos in media tab dated 5/4). We reviewed the photos with Dr. Chiu, drainage appears serous in nature. We discussed with the patient not to remove or change any of the dressings, rather enforce/reinforce dressing if they become saturated. Patient also has follow up scheduled for 5/13, we would like to see her sooner so we will arrange for her to be seen in clinic this week. Patient expresses understanding, will call back with any further questions or concerns.     - Discussed with Dr. Apple Yip, TRISHA-CNP  Plastic and Reconstructive Surgery   Available via Epic chat, pager: [phone] or team phones: [phone]

## 2025-05-05 NOTE — PROGRESS NOTES
PLASTIC SURGERY CLINIC VISIT  POSTOP BREAST RECONSTRUCTION     Date: 5/6/25  Date of Surgery: 5/1/25  Surgical Procedure: Bilateral Breast Reconstruction Revision with Implants and Fat Grafting         HPI:   Julieta Lagunas 59 y.o. female is here for post-operative appointment for the above procedure(s).      Interval changes as of this date:   5/6 Doing well overall. ROSARIO drain site oozing over the weekend. Here today for drain evaluation. She reports doing overall well. She denies being active. She reports minimal output from the drain, but it is leaking around the site rather than through the drain. She has been actively managing the drain manually.  Patient is experiencing swelling, particularly on the left breast, and reports heaviness in certain areas.       MEDICATIONS  Current Medications[1]      OBJECTIVE [x]Expand by Default  There were no vitals taken for this visit.     REVIEW OF SYSTEMS:    Constitutional: negative for fevers, chills, unintentional weight loss  HEENT: negative for changes in vision, headaches, changes in hearing, congestion, sore throat  Cardiovascular: negative for chest pain, palpitations  Respiratory: negative for cough, shortness of breath  Gastrointestinal: negative for nausea, vomiting, diarrhea  Genitourinary: negative for dysuria, hematuria  Musculoskeletal: negative for joint swelling or pain  Skin: negative for rashes or lesions  Psych: negative for depression, anxiety  Endocrine: negative for polyuria, polydipsia, cold/heat intolerance  Hem/Lymph: negative for bleeding disorder     PHYSICAL EXAM  General: alert and oriented, no apparent distress    Focused exam of the breasts:  Right: ***  Left: ***      ASSESSMENT/PLAN  Julieta Lagunas 59 y.o. female who had Bilateral Breast Reconstruction Revision with Implants and Fat Grafting     ACE wrap in place. Removed at this visit.    ROSARIO drain(s) in place. No erythema or edema surrounding the drain site. There is serous output  from the drain. Patient recorded output of the drains showed 2 consecutive days of less than 30cc output at time of removal. Patient was educated on purpose of surgical drains and informed of risk for seroma post drain removal.       ROSARIO drain(s) removed at this visit: ***     Continue with activity restrictions  Continue with increased protein intake.  Continue drain use to maintain suction and prevent fluid accumulation around the mesh  Continue wearing compression in specific areas to reduce swelling and contour shape  Follow up next week to reassess healing, swelling, and overall breast shape       RT 5/13/25        Scribe Attestation  By signing my name below, I Tootiedeann Childers , Moniqueibzuleika   attest that this documentation has been prepared under the direction and in the presence of Liz Chiu MD.         [1]   Current Outpatient Medications:     [Paused] aspirin 81 mg EC tablet, Take by mouth., Disp: , Rfl:     calcium carbonate-vitamin D3 1,000 mg-20 mcg (800 unit) tablet, Take by mouth once daily., Disp: , Rfl:     diazePAM (Valium) 5 mg tablet, Take 1 tablet (5 mg) by mouth 1 time for 1 dose., Disp: 1 tablet, Rfl: 0    gabapentin (Neurontin) 300 mg capsule, Take 1 capsule (300 mg) by mouth every 8 hours for 14 days., Disp: 42 capsule, Rfl: 0    hydroCHLOROthiazide (HYDRODiuril) 25 mg tablet, Take 1 tablet (25 mg) by mouth once daily., Disp: 90 tablet, Rfl: 0    losartan (Cozaar) 25 mg tablet, Take by mouth. (Patient taking differently: Take 1 tablet (25 mg) by mouth once daily.), Disp: , Rfl:     metoprolol succinate XL (Toprol-XL) 50 mg 24 hr tablet, Take 1 tablet (50 mg) by mouth once daily., Disp: 90 tablet, Rfl: 0    mirabegron (Myrbetriq) 50 mg tablet extended release 24 hr 24 hr tablet, Take 1 tablet (50 mg) by mouth once daily., Disp: , Rfl:     multivitamin (Multiple Vitamins) tablet, Take by mouth., Disp: , Rfl:     omeprazole (PriLOSEC) 20 mg DR capsule, Take 1 capsule (20 mg) by mouth 2 times a  day., Disp: , Rfl:     ondansetron (Zofran) 4 mg tablet, Take 1 tablet (4 mg) by mouth every 8 hours if needed for nausea or vomiting., Disp: 10 tablet, Rfl: 0    tolterodine LA (Detrol LA) 2 mg 24 hr capsule, TAKE 1 CAPSULE(2 MG) BY MOUTH DAILY. DO NOT CRUSH, CHEW, OR SPLIT, Disp: 90 capsule, Rfl: 3    venlafaxine XR (Effexor-XR) 75 mg 24 hr capsule, TAKE 1 CAPSULE BY MOUTH ONCE DAILY, Disp: 90 capsule, Rfl: 3    zolpidem (Ambien) 10 mg tablet, Take by mouth., Disp: , Rfl:

## 2025-05-06 ENCOUNTER — OFFICE VISIT (OUTPATIENT)
Dept: PLASTIC SURGERY | Facility: CLINIC | Age: 60
End: 2025-05-06
Payer: COMMERCIAL

## 2025-05-06 VITALS
SYSTOLIC BLOOD PRESSURE: 105 MMHG | BODY MASS INDEX: 27.49 KG/M2 | HEART RATE: 70 BPM | WEIGHT: 161 LBS | DIASTOLIC BLOOD PRESSURE: 68 MMHG | HEIGHT: 64 IN

## 2025-05-06 DIAGNOSIS — N65.1 BREAST ASYMMETRY BETWEEN NATIVE BREAST AND RECONSTRUCTED BREAST: Primary | ICD-10-CM

## 2025-05-06 PROCEDURE — 99024 POSTOP FOLLOW-UP VISIT: CPT | Performed by: SURGERY

## 2025-05-06 PROCEDURE — 3008F BODY MASS INDEX DOCD: CPT | Performed by: SURGERY

## 2025-05-09 DIAGNOSIS — I10 BENIGN ESSENTIAL HYPERTENSION: ICD-10-CM

## 2025-05-10 RX ORDER — METOPROLOL SUCCINATE 50 MG/1
50 TABLET, EXTENDED RELEASE ORAL DAILY
Qty: 90 TABLET | Refills: 0 | Status: SHIPPED | OUTPATIENT
Start: 2025-05-10

## 2025-05-10 NOTE — PROGRESS NOTES
PLASTIC SURGERY CLINIC VISIT  POSTOP BREAST RECONSTRUCTION     Date: 5/13/25  Date of Surgery: 5/1/25  Surgical Procedure: Bilateral Breast Reconstruction Revision with Implants and Fat Grafting         HPI:   Julieta Lagunas 59 y.o. female is here for post-operative appointment for the above procedure(s).      Interval changes as of this date:   5/6 Doing well overall. ROSARIO drain site oozing over the weekend. Here today for drain evaluation. She reports doing overall well. She denies being active. She reports minimal output from the drain, but it is leaking around the site rather than through the drain. She has been actively managing the drain manually.  Patient is experiencing swelling, particularly on the left breast, and reports heaviness in certain areas.  5/13 Doing well, pain is well controlled. Endorses adequate activity restrictions and protein intake      MEDICATIONS  Current Medications[1]      OBJECTIVE [x]Expand by Default  There were no vitals taken for this visit.     REVIEW OF SYSTEMS:    Constitutional: negative for fevers, chills, unintentional weight loss  HEENT: negative for changes in vision, headaches, changes in hearing, congestion, sore throat  Cardiovascular: negative for chest pain, palpitations  Respiratory: negative for cough, shortness of breath  Gastrointestinal: negative for nausea, vomiting, diarrhea  Genitourinary: negative for dysuria, hematuria  Musculoskeletal: negative for joint swelling or pain  Skin: negative for rashes or lesions  Psych: negative for depression, anxiety  Endocrine: negative for polyuria, polydipsia, cold/heat intolerance  Hem/Lymph: negative for bleeding disorder     PHYSICAL EXAM  General: alert and oriented, no apparent distress    Focused exam of the breasts:  Right: ***  Left: ***      ASSESSMENT/PLAN  Julieta Lagunas 59 y.o. female who had Bilateral Breast Reconstruction Revision with Implants and Fat Grafting       ROSARIO drain(s) in place. No erythema or  edema surrounding the drain site. There is serous output from the drain. Patient recorded output of the drains showed 2 consecutive days of less than 30cc output at time of removal. Patient was educated on purpose of surgical drains and informed of risk for seroma post drain removal.       ROSARIO drain(s) removed at this visit: ***     Continue with activity restrictions  Continue with increased protein intake.  Continue wearing compression in specific areas to reduce swelling and contour shape  Follow up next week to reassess healing, swelling, and overall breast shape       RTC        [1]   Current Outpatient Medications:     aspirin 81 mg EC tablet, Take by mouth., Disp: , Rfl:     calcium carbonate-vitamin D3 1,000 mg-20 mcg (800 unit) tablet, Take by mouth once daily., Disp: , Rfl:     diazePAM (Valium) 5 mg tablet, Take 1 tablet (5 mg) by mouth 1 time for 1 dose., Disp: 1 tablet, Rfl: 0    gabapentin (Neurontin) 300 mg capsule, Take 1 capsule (300 mg) by mouth every 8 hours for 14 days., Disp: 42 capsule, Rfl: 0    hydroCHLOROthiazide (HYDRODiuril) 25 mg tablet, Take 1 tablet (25 mg) by mouth once daily., Disp: 90 tablet, Rfl: 0    losartan (Cozaar) 25 mg tablet, Take by mouth. (Patient taking differently: Take 1 tablet (25 mg) by mouth once daily.), Disp: , Rfl:     metoprolol succinate XL (Toprol-XL) 50 mg 24 hr tablet, Take 1 tablet (50 mg) by mouth once daily., Disp: 90 tablet, Rfl: 0    mirabegron (Myrbetriq) 50 mg tablet extended release 24 hr 24 hr tablet, Take 1 tablet (50 mg) by mouth once daily., Disp: , Rfl:     multivitamin (Multiple Vitamins) tablet, Take by mouth., Disp: , Rfl:     omeprazole (PriLOSEC) 20 mg DR capsule, Take 1 capsule (20 mg) by mouth 2 times a day., Disp: , Rfl:     ondansetron (Zofran) 4 mg tablet, Take 1 tablet (4 mg) by mouth every 8 hours if needed for nausea or vomiting., Disp: 10 tablet, Rfl: 0    tolterodine LA (Detrol LA) 2 mg 24 hr capsule, TAKE 1 CAPSULE(2 MG) BY MOUTH  DAILY. DO NOT CRUSH, CHEW, OR SPLIT, Disp: 90 capsule, Rfl: 3    venlafaxine XR (Effexor-XR) 75 mg 24 hr capsule, TAKE 1 CAPSULE BY MOUTH ONCE DAILY, Disp: 90 capsule, Rfl: 3    zolpidem (Ambien) 10 mg tablet, Take by mouth., Disp: , Rfl:

## 2025-05-13 ENCOUNTER — APPOINTMENT (OUTPATIENT)
Dept: PLASTIC SURGERY | Facility: CLINIC | Age: 60
End: 2025-05-13
Payer: COMMERCIAL

## 2025-05-13 VITALS
WEIGHT: 161 LBS | SYSTOLIC BLOOD PRESSURE: 104 MMHG | HEART RATE: 67 BPM | HEIGHT: 64 IN | BODY MASS INDEX: 27.49 KG/M2 | DIASTOLIC BLOOD PRESSURE: 69 MMHG

## 2025-05-13 DIAGNOSIS — T85.44XS CAPSULAR CONTRACTURE OF BREAST IMPLANT, SEQUELA: Primary | ICD-10-CM

## 2025-05-13 DIAGNOSIS — Z98.890 S/P BREAST RECONSTRUCTION: ICD-10-CM

## 2025-05-14 LAB
LABORATORY COMMENT REPORT: NORMAL
PATH REPORT.FINAL DX SPEC: NORMAL
PATH REPORT.GROSS SPEC: NORMAL
PATH REPORT.RELEVANT HX SPEC: NORMAL
PATH REPORT.TOTAL CANCER: NORMAL

## 2025-05-23 NOTE — PROGRESS NOTES
PLASTIC SURGERY CLINIC VISIT  POSTOP BREAST RECONSTRUCTION     Date: 5/27/25  Date of Surgery: 5/1/25  Surgical Procedure: Bilateral Breast Reconstruction Revision with Implants and Fat Grafting         HPI:   Julieta Lagunas 59 y.o. female is here for post-operative appointment for the above procedure(s).      Interval changes as of this date:   5/6 Doing well overall. ROSARIO drain site oozing over the weekend. Here today for drain evaluation. She reports doing overall well. She denies being active. She reports minimal output from the drain, but it is leaking around the site rather than through the drain. She has been actively managing the drain manually.  Patient is experiencing swelling, particularly on the left breast, and reports heaviness in certain areas.  5/13 Doing well, pain is well controlled. Endorses adequate activity restrictions and protein intake. She reports doing overall well. She reports minimal output of 20-25 cc from the drain which she has been managing manually  5/27 Patient is doing well today. She continues to have drainage from her breast at this time. We will plan to drain the breast today with aspiration of the right breast.      MEDICATIONS  Current Medications[1]      OBJECTIVE [x]Expand by Default  There were no vitals taken for this visit.     REVIEW OF SYSTEMS:    Constitutional: negative for fevers, chills, unintentional weight loss  HEENT: negative for changes in vision, headaches, changes in hearing, congestion, sore throat  Cardiovascular: negative for chest pain, palpitations  Respiratory: negative for cough, shortness of breath  Gastrointestinal: negative for nausea, vomiting, diarrhea  Genitourinary: negative for dysuria, hematuria  Musculoskeletal: negative for joint swelling or pain  Skin: negative for rashes or lesions  Psych: negative for depression, anxiety  Endocrine: negative for polyuria, polydipsia, cold/heat intolerance  Hem/Lymph: negative for bleeding disorder      PHYSICAL EXAM  General: alert and oriented, no apparent distress    Focused exam of the breasts:  Right: Incisions C/D/I, healing well. Flaps viable and well perfused. Minimal edema.   Left: Incisions C/D/I, healing well. Flaps viable and well perfused. Minimal edema.      ASSESSMENT/PLAN  Julieta Lagunas 59 y.o. female who had Bilateral Breast Reconstruction Revision with Implants and Fat Grafting     Doing well post operatively, pain well controlled. Endorses adequate protein intake. Following activity restrictions. Incisions healing well.      Continue with activity restrictions  Continue with increased protein intake.  Right breast aspirated today, 110cc removed.     Follow up in one week.     Scribe Attestation  By signing my name below, I, Abena Hollis   attest that this documentation has been prepared under the direction and in the presence of Liz Chiu MD.           [1]   Current Outpatient Medications:     mirabegron (Myrbetriq) 50 mg tablet extended release 24 hr 24 hr tablet, Take 1 tablet (50 mg) by mouth once daily., Disp: , Rfl:     aspirin 81 mg EC tablet, Take by mouth., Disp: , Rfl:     calcium carbonate-vitamin D3 1,000 mg-20 mcg (800 unit) tablet, Take by mouth once daily., Disp: , Rfl:     diazePAM (Valium) 5 mg tablet, Take 1 tablet (5 mg) by mouth 1 time for 1 dose., Disp: 1 tablet, Rfl: 0    gabapentin (Neurontin) 300 mg capsule, Take 1 capsule (300 mg) by mouth every 8 hours for 14 days., Disp: 42 capsule, Rfl: 0    hydroCHLOROthiazide (HYDRODiuril) 25 mg tablet, Take 1 tablet (25 mg) by mouth once daily., Disp: 90 tablet, Rfl: 0    losartan (Cozaar) 25 mg tablet, Take by mouth. (Patient taking differently: Take 1 tablet (25 mg) by mouth once daily.), Disp: , Rfl:     metoprolol succinate XL (Toprol-XL) 50 mg 24 hr tablet, Take 1 tablet (50 mg) by mouth once daily., Disp: 90 tablet, Rfl: 0    multivitamin (Multiple Vitamins) tablet, Take by mouth., Disp: , Rfl:     omeprazole  (PriLOSEC) 20 mg DR capsule, Take 1 capsule (20 mg) by mouth 2 times a day., Disp: , Rfl:     ondansetron (Zofran) 4 mg tablet, Take 1 tablet (4 mg) by mouth every 8 hours if needed for nausea or vomiting., Disp: 10 tablet, Rfl: 0    tolterodine LA (Detrol LA) 2 mg 24 hr capsule, TAKE 1 CAPSULE(2 MG) BY MOUTH DAILY. DO NOT CRUSH, CHEW, OR SPLIT, Disp: 90 capsule, Rfl: 3    venlafaxine XR (Effexor-XR) 75 mg 24 hr capsule, TAKE 1 CAPSULE BY MOUTH ONCE DAILY, Disp: 90 capsule, Rfl: 3    zolpidem (Ambien) 10 mg tablet, Take by mouth., Disp: , Rfl:

## 2025-05-27 ENCOUNTER — APPOINTMENT (OUTPATIENT)
Dept: PLASTIC SURGERY | Facility: CLINIC | Age: 60
End: 2025-05-27
Payer: COMMERCIAL

## 2025-05-27 ENCOUNTER — OFFICE VISIT (OUTPATIENT)
Dept: SURGERY | Facility: CLINIC | Age: 60
End: 2025-05-27
Payer: COMMERCIAL

## 2025-05-27 VITALS
WEIGHT: 161 LBS | SYSTOLIC BLOOD PRESSURE: 122 MMHG | HEIGHT: 64 IN | BODY MASS INDEX: 27.49 KG/M2 | HEART RATE: 71 BPM | DIASTOLIC BLOOD PRESSURE: 84 MMHG

## 2025-05-27 VITALS
BODY MASS INDEX: 26.63 KG/M2 | WEIGHT: 156 LBS | HEIGHT: 64 IN | RESPIRATION RATE: 16 BRPM | SYSTOLIC BLOOD PRESSURE: 121 MMHG | DIASTOLIC BLOOD PRESSURE: 79 MMHG | HEART RATE: 67 BPM

## 2025-05-27 DIAGNOSIS — T85.44XS CAPSULAR CONTRACTURE OF BREAST IMPLANT, SEQUELA: Primary | ICD-10-CM

## 2025-05-27 DIAGNOSIS — I47.10 SVT (SUPRAVENTRICULAR TACHYCARDIA): ICD-10-CM

## 2025-05-27 DIAGNOSIS — R19.00 ABDOMINAL WALL BULGE: ICD-10-CM

## 2025-05-27 DIAGNOSIS — N65.1 BREAST ASYMMETRY BETWEEN NATIVE BREAST AND RECONSTRUCTED BREAST: ICD-10-CM

## 2025-05-27 PROCEDURE — 3008F BODY MASS INDEX DOCD: CPT | Performed by: STUDENT IN AN ORGANIZED HEALTH CARE EDUCATION/TRAINING PROGRAM

## 2025-05-27 PROCEDURE — 99215 OFFICE O/P EST HI 40 MIN: CPT | Performed by: STUDENT IN AN ORGANIZED HEALTH CARE EDUCATION/TRAINING PROGRAM

## 2025-05-27 PROCEDURE — 99205 OFFICE O/P NEW HI 60 MIN: CPT | Performed by: STUDENT IN AN ORGANIZED HEALTH CARE EDUCATION/TRAINING PROGRAM

## 2025-05-27 ASSESSMENT — PAIN SCALES - GENERAL: PAINLEVEL_OUTOF10: 0-NO PAIN

## 2025-05-27 NOTE — PROGRESS NOTES
History Of Present Illness  Patient is a 59 y.o. female who presents to discuss further workup and/or possible intervention for a minimally symptomatic right sided spigelian hernia.  She has noticed the swelling for quite some time, however does become increasingly symptomatic with pressure type pain in the area.  Her past surgical history is significant for a TRAM flap in 2015 for breast reconstruction.  This was complicated by implant failure and recently underwent revision on May 1 with Dr. Chiu.  Due to the right sided abdominal pain and associated swelling, a CT scan of the abdomen pelvis was performed.  This did show a small to moderate-sized spigelian hernia.  No other acute abnormalities.  Currently denies any associated fevers, chills, nausea, vomiting, chest pain, shortness of breath.  Denies any significant cardiac or respiratory comorbidities.  Denies any other previous abdominal surgery.  Denies any episodes of incarceration or strangulation.     Past Medical History  Medical History[1]    Surgical History  Surgical History[2]     Social History  She reports that she has quit smoking. Her smoking use included cigarettes. She has never been exposed to tobacco smoke. She has never used smokeless tobacco. She reports current alcohol use. She reports that she does not use drugs.    Family History  Family History[3]     Allergies  Latex    Review of Systems  - CONSTITUTIONAL: Denies fever and chills.  - HEENT: Denies changes in vision and hearing.  - RESPIRATORY: Denies SOB and cough.  - CV: Denies palpitations and CP.  - GI: see HPI  - : Denies dysuria and urinary frequency.  - MSK: Denies myalgia and joint pain.  - SKIN: Denies rash and pruritus.  - NEUROLOGICAL: Denies headache and syncope.  - PSYCHIATRIC: Denies recent changes in mood. Denies anxiety and depression.     Physical Exam  - GENERAL: Alert and oriented x 3. No acute distress. Well-nourished.  - EYES: EOMI. Anicteric.  - HENT: Moist mucous  "membranes. No scleral icterus. No cervical lymphadenopathy.  - LUNGS: Breathing comfortably on room air. No accessory muscle use, no distress.  - CARDIOVASCULAR: Regular rate and rhythm. No murmur. No JVD.  - ABDOMEN: Soft, non-tender and non-distended.  Well-healed transverse scar from her TRAM flap.  Overlying the right lower quadrant there is a visible bulge, fascial defect unable to be appreciated, nontender to palpation.  - EXTREMITIES: No edema. Non-tender.  - SKIN: No rashes or lesions. Warm.  - NEUROLOGIC: No focal neurological deficits. CN II-XII grossly intact, but not individually tested.  - PSYCHIATRIC: Cooperative. Appropriate mood and affect.    Last Recorded Vitals  Blood pressure 121/79, pulse 67, resp. rate 16, height 1.626 m (5' 4\"), weight 70.8 kg (156 lb).    Relevant Results  No results found.     Assessment and Plan  Patient is a 59 y.o. female who presents to discuss further workup and or possible invention for increasingly symptomatic right spigelian hernia.  We discussed at length the etiology, pathophysiology, natural course, various treatment options for spigelian hernias.  Given the increasingly symptomatic nature of this hernia and the appearance of CT scan I did recommend surgery.  We therefore discussed the risk and benefits of a robotic spigelian hernia repair.  We discussed the risks of bleeding, infection, nonresolution of symptoms, hernia recurrence, anesthesia complications.  Her questions were answered and at this time she will be scheduled for above operation in the near future without the need for PAT.  She will call our office to schedule when she has discussed various possible surgery dates with her family.    Onur Lyman MD         [1]   Past Medical History:  Diagnosis Date    Anxiety disorder, unspecified     Anxiety    Cutaneous abscess of left lower limb 08/30/2016    Abscess of knee, left    Displaced fracture of cuboid bone of right foot, initial encounter for " closed fracture 09/23/2019    Fracture of cuboid of right foot    Encounter for antineoplastic chemotherapy     Encounter for antineoplastic chemotherapy    Encounter for gynecological examination (general) (routine) without abnormal findings 03/03/2016    Visit for gynecologic examination    Encounter for screening for malignant neoplasm of cervix 03/03/2016    Cervical cancer screening    GERD (gastroesophageal reflux disease)     Hypertension     Other acute postprocedural pain 07/31/2014    Acute postoperative pain    Other conditions influencing health status 05/15/2015    Open wound of trunk, complicated    Other conditions influencing health status     Colonoscopy planned    Personal history of malignant neoplasm of breast     History of malignant neoplasm of breast    Personal history of malignant neoplasm of breast 10/27/2016    History of adenocarcinoma of breast    Personal history of other diseases of the digestive system 03/02/2017    History of constipation    Personal history of other mental and behavioral disorders 10/23/2019    History of depression    PONV (postoperative nausea and vomiting)     Radiodermatitis, unspecified 10/05/2020    Radiation dermatitis    Unspecified abdominal hernia without obstruction or gangrene 09/22/2015    Hernia   [2]   Past Surgical History:  Procedure Laterality Date    DILATION AND CURETTAGE OF UTERUS  12/26/2013    Dilation And Curettage    MASTECTOMY  12/26/2013    Breast Surgery Mastectomy    OTHER SURGICAL HISTORY  12/26/2013    Breast Surgery Revision Of Reconstructed Breast Bilaterally    OTHER SURGICAL HISTORY  12/26/2013    Breast Reconstruction With Tissue Expander Bilateral    OTHER SURGICAL HISTORY  12/22/2014    Breast Surgery Nipple Exploration Left Breast    OTHER SURGICAL HISTORY  02/11/2014    Breast Reconstruction With Implant Prosthesis Bilateral    OTHER SURGICAL HISTORY  09/22/2015    Bilateral Breast Reconstruction With Free Flap    OTHER  SURGICAL HISTORY  11/24/2014    Breast Surgery Removal Of Mammary Implant Left Breast    OTHER SURGICAL HISTORY  02/20/2014    Radiation Therapy   [3] No family history on file.

## 2025-05-28 ENCOUNTER — PATIENT MESSAGE (OUTPATIENT)
Dept: HEMATOLOGY/ONCOLOGY | Facility: HOSPITAL | Age: 60
End: 2025-05-28
Payer: COMMERCIAL

## 2025-05-28 DIAGNOSIS — G89.18 POST-OP PAIN: ICD-10-CM

## 2025-05-28 DIAGNOSIS — N65.1 BREAST ASYMMETRY FOLLOWING RECONSTRUCTIVE SURGERY: ICD-10-CM

## 2025-05-28 DIAGNOSIS — I10 BENIGN ESSENTIAL HYPERTENSION: ICD-10-CM

## 2025-05-28 RX ORDER — GABAPENTIN 300 MG/1
300 CAPSULE ORAL EVERY 8 HOURS
Qty: 42 CAPSULE | Refills: 0 | Status: SHIPPED | OUTPATIENT
Start: 2025-05-28 | End: 2025-06-11

## 2025-05-30 RX ORDER — HYDROCHLOROTHIAZIDE 25 MG/1
25 TABLET ORAL DAILY
Qty: 90 TABLET | Refills: 0 | Status: SHIPPED | OUTPATIENT
Start: 2025-05-30

## 2025-06-02 NOTE — PROGRESS NOTES
"       PLASTIC SURGERY CLINIC VISIT  POSTOP BREAST RECONSTRUCTION     Date: 6/2/25  Date of Surgery: 5/1/25  Surgical Procedure: Bilateral Breast Reconstruction Revision with Implants and Fat Grafting         HPI:   Julieta Lagunas 59 y.o. female is here for post-operative appointment for the above procedure(s).      Interval changes as of this date:   5/6 Doing well overall. ROSARIO drain site oozing over the weekend. Here today for drain evaluation. She reports doing overall well. She denies being active. She reports minimal output from the drain, but it is leaking around the site rather than through the drain. She has been actively managing the drain manually.  Patient is experiencing swelling, particularly on the left breast, and reports heaviness in certain areas.  5/13 Doing well, pain is well controlled. Endorses adequate activity restrictions and protein intake. She reports doing overall well. She reports minimal output of 20-25 cc from the drain which she has been managing manually  5/27 Patient is doing well today. She continues to have drainage from her breast at this time. We will plan to drain the breast today with aspiration of the right breast.   6/3 Doing well overall. Here today to assess for seroma of right breast. Has been compliant with ace wrap.     MEDICATIONS  Current Medications[1]      OBJECTIVE [x]Expand by Default  Blood pressure 135/89, pulse 64, height 1.626 m (5' 4\"), weight 70.8 kg (156 lb).     REVIEW OF SYSTEMS:    Constitutional: negative for fevers, chills, unintentional weight loss  HEENT: negative for changes in vision, headaches, changes in hearing, congestion, sore throat  Cardiovascular: negative for chest pain, palpitations  Respiratory: negative for cough, shortness of breath  Gastrointestinal: negative for nausea, vomiting, diarrhea  Genitourinary: negative for dysuria, hematuria  Musculoskeletal: negative for joint swelling or pain  Skin: negative for rashes or lesions  Psych: " negative for depression, anxiety  Endocrine: negative for polyuria, polydipsia, cold/heat intolerance  Hem/Lymph: negative for bleeding disorder     PHYSICAL EXAM  General: alert and oriented, no apparent distress    Focused exam of the breasts:  Right: Incisions C/D/I, healing well. Flaps viable and well perfused. Minimal edema.   Left: Incisions C/D/I, healing well. Flaps viable and well perfused. Minimal edema.   Mostly edema on the lower pole and soft tissue anteriorly on the right, tried to aspirate on the inferior medial pole on the right with implant displacement,minimal output. Steri strips and Tegaderm applied on the right to help with the edema. Left is healing well, still has a little bit of swelling on the lateral IMF, steri strip applied and ACE wrapped.      ASSESSMENT/PLAN  Julieta Lagunas 59 y.o. female who had Bilateral Breast Reconstruction Revision with Implants and Fat Grafting     Doing well post operatively, pain well controlled. Endorses adequate protein intake. Following activity restrictions. Incisions healing well. Small amount of edema noted on exam, no seroma able to be aspirated.      Continue with activity restrictions  Continue with increased protein intake.  Will apply steri strips, tegaderm bra and ace wrap for compression today   Not cleared to return to work yet   RTC in 1 week for seroma check    DONNA Araujo Attestation  By signing my name below, IJerome Scribe, attest that this documentation has been prepared under the direction and in the presence of Liz Chiu MD. Verbal consent obtained from the patient.           [1]   Current Outpatient Medications:     aspirin 81 mg EC tablet, Take by mouth., Disp: , Rfl:     calcium carbonate-vitamin D3 1,000 mg-20 mcg (800 unit) tablet, Take by mouth once daily., Disp: , Rfl:     diazePAM (Valium) 5 mg tablet, Take 1 tablet (5 mg) by mouth 1 time for 1 dose., Disp: 1 tablet, Rfl: 0    gabapentin  (Neurontin) 300 mg capsule, Take 1 capsule (300 mg) by mouth every 8 hours for 14 days., Disp: 42 capsule, Rfl: 0    hydroCHLOROthiazide (HYDRODiuril) 25 mg tablet, Take 1 tablet (25 mg) by mouth once daily., Disp: 90 tablet, Rfl: 0    losartan (Cozaar) 25 mg tablet, Take by mouth. (Patient taking differently: Take 1 tablet (25 mg) by mouth once daily.), Disp: , Rfl:     metoprolol succinate XL (Toprol-XL) 50 mg 24 hr tablet, Take 1 tablet (50 mg) by mouth once daily., Disp: 90 tablet, Rfl: 0    mirabegron (Myrbetriq) 50 mg tablet extended release 24 hr 24 hr tablet, Take 1 tablet (50 mg) by mouth once daily., Disp: , Rfl:     multivitamin (Multiple Vitamins) tablet, Take by mouth., Disp: , Rfl:     omeprazole (PriLOSEC) 20 mg DR capsule, Take 1 capsule (20 mg) by mouth 2 times a day., Disp: , Rfl:     ondansetron (Zofran) 4 mg tablet, Take 1 tablet (4 mg) by mouth every 8 hours if needed for nausea or vomiting., Disp: 10 tablet, Rfl: 0    tolterodine LA (Detrol LA) 2 mg 24 hr capsule, TAKE 1 CAPSULE(2 MG) BY MOUTH DAILY. DO NOT CRUSH, CHEW, OR SPLIT, Disp: 90 capsule, Rfl: 3    venlafaxine XR (Effexor-XR) 75 mg 24 hr capsule, TAKE 1 CAPSULE BY MOUTH ONCE DAILY, Disp: 90 capsule, Rfl: 3    zolpidem (Ambien) 10 mg tablet, Take by mouth., Disp: , Rfl:

## 2025-06-03 ENCOUNTER — APPOINTMENT (OUTPATIENT)
Dept: PLASTIC SURGERY | Facility: CLINIC | Age: 60
End: 2025-06-03
Payer: COMMERCIAL

## 2025-06-03 VITALS
BODY MASS INDEX: 26.63 KG/M2 | DIASTOLIC BLOOD PRESSURE: 89 MMHG | SYSTOLIC BLOOD PRESSURE: 135 MMHG | WEIGHT: 156 LBS | HEART RATE: 64 BPM | HEIGHT: 64 IN

## 2025-06-03 DIAGNOSIS — Z98.890 S/P BREAST RECONSTRUCTION: Primary | ICD-10-CM

## 2025-06-03 DIAGNOSIS — N65.1 BREAST ASYMMETRY BETWEEN NATIVE BREAST AND RECONSTRUCTED BREAST: ICD-10-CM

## 2025-06-03 PROBLEM — I47.10 SVT (SUPRAVENTRICULAR TACHYCARDIA): Status: ACTIVE | Noted: 2025-06-03

## 2025-06-09 NOTE — PROGRESS NOTES
"       PLASTIC SURGERY CLINIC VISIT  POSTOP BREAST RECONSTRUCTION     Date: 6/10/25  Date of Surgery: 5/1/25  Surgical Procedure: Bilateral Breast Reconstruction Revision with Implants and Fat Grafting         HPI:   Julieta Lagunas 59 y.o. female is here for post-operative appointment for the above procedure(s).      Interval changes as of this date:   5/6 Doing well overall. ROSARIO drain site oozing over the weekend. Here today for drain evaluation. She reports doing overall well. She denies being active. She reports minimal output from the drain, but it is leaking around the site rather than through the drain. She has been actively managing the drain manually.  Patient is experiencing swelling, particularly on the left breast, and reports heaviness in certain areas.  5/13 Doing well, pain is well controlled. Endorses adequate activity restrictions and protein intake. She reports doing overall well. She reports minimal output of 20-25 cc from the drain which she has been managing manually  5/27 Patient is doing well today. She continues to have drainage from her breast at this time. We will plan to drain the breast today with aspiration of the right breast.   6/3 Doing well overall. Here today to assess for seroma of right breast. Has been compliant with ace wrap.   6/10 Doing well overall. Here for seroma evaluation. Endorses compliance with ace wrap     MEDICATIONS  Current Medications[1]      OBJECTIVE [x]Expand by Default  Blood pressure 140/86, pulse 63, height 1.626 m (5' 4\"), weight 70.8 kg (156 lb).     REVIEW OF SYSTEMS:    Constitutional: negative for fevers, chills, unintentional weight loss  HEENT: negative for changes in vision, headaches, changes in hearing, congestion, sore throat  Cardiovascular: negative for chest pain, palpitations  Respiratory: negative for cough, shortness of breath  Gastrointestinal: negative for nausea, vomiting, diarrhea  Genitourinary: negative for dysuria, " hematuria  Musculoskeletal: negative for joint swelling or pain  Skin: negative for rashes or lesions  Psych: negative for depression, anxiety  Endocrine: negative for polyuria, polydipsia, cold/heat intolerance  Hem/Lymph: negative for bleeding disorder     PHYSICAL EXAM  General: alert and oriented, no apparent distress    Focused exam of the breasts:  Right: Incisions C/D/I, healing well. Minimal edema.   No fluid wave.  Implant starting to ride high and some decrease in softness and motion  Left: Incisions C/D/I, healing well. Flap viable and well perfused. Minimal edema. good take of fat graft in medial pole     ASSESSMENT/PLAN  Julieta Lagunas 59 y.o. female who had Bilateral Breast Reconstruction Revision with Implants and Fat Grafting     Doing well post operatively, pain well controlled. Endorses adequate protein intake. Following activity restrictions. Incisions healing well.      Continue with activity restrictions  Continue with increased protein intake.  Not cleared to return to work yet   She has early signs of capsular contracture, discussed use of Singulair to prevent it. Reviewed s/e. Will order this for her for 30 days.    Will ACE wrap her upper pole, and put her in a bra  Will complete her photos today.  She can proceed with her hernia surgery/repair 6 weeks after her surgery.   Plan for fat grafting about 12 weeks after healing from her hernia repair    RTC in 2 weeks.     Scribe Attestation  By signing my name below, Jerome HENLEY Scribe, attest that this documentation has been prepared under the direction and in the presence of Liz Chiu MD. Verbal consent obtained from the patient.      Attending Attestation:  Liz HENLEY MD, personal performed the history, exam, and decision making on this patient.         [1]   Current Outpatient Medications:     aspirin 81 mg EC tablet, Take by mouth., Disp: , Rfl:     calcium carbonate-vitamin D3 1,000 mg-20 mcg (800 unit) tablet, Take by mouth  once daily., Disp: , Rfl:     diazePAM (Valium) 5 mg tablet, Take 1 tablet (5 mg) by mouth 1 time for 1 dose., Disp: 1 tablet, Rfl: 0    gabapentin (Neurontin) 300 mg capsule, Take 1 capsule (300 mg) by mouth every 8 hours for 14 days., Disp: 42 capsule, Rfl: 0    hydroCHLOROthiazide (HYDRODiuril) 25 mg tablet, Take 1 tablet (25 mg) by mouth once daily., Disp: 90 tablet, Rfl: 0    losartan (Cozaar) 25 mg tablet, Take by mouth. (Patient taking differently: Take 1 tablet (25 mg) by mouth once daily.), Disp: , Rfl:     metoprolol succinate XL (Toprol-XL) 50 mg 24 hr tablet, Take 1 tablet (50 mg) by mouth once daily., Disp: 90 tablet, Rfl: 0    mirabegron (Myrbetriq) 50 mg tablet extended release 24 hr 24 hr tablet, Take 1 tablet (50 mg) by mouth once daily., Disp: , Rfl:     multivitamin (Multiple Vitamins) tablet, Take by mouth., Disp: , Rfl:     omeprazole (PriLOSEC) 20 mg DR capsule, Take 1 capsule (20 mg) by mouth 2 times a day., Disp: , Rfl:     ondansetron (Zofran) 4 mg tablet, Take 1 tablet (4 mg) by mouth every 8 hours if needed for nausea or vomiting., Disp: 10 tablet, Rfl: 0    tolterodine LA (Detrol LA) 2 mg 24 hr capsule, TAKE 1 CAPSULE(2 MG) BY MOUTH DAILY. DO NOT CRUSH, CHEW, OR SPLIT, Disp: 90 capsule, Rfl: 3    venlafaxine XR (Effexor-XR) 75 mg 24 hr capsule, TAKE 1 CAPSULE BY MOUTH ONCE DAILY, Disp: 90 capsule, Rfl: 3    zolpidem (Ambien) 10 mg tablet, Take by mouth., Disp: , Rfl:

## 2025-06-10 ENCOUNTER — APPOINTMENT (OUTPATIENT)
Dept: PLASTIC SURGERY | Facility: CLINIC | Age: 60
End: 2025-06-10
Payer: COMMERCIAL

## 2025-06-10 VITALS
HEART RATE: 63 BPM | BODY MASS INDEX: 26.63 KG/M2 | DIASTOLIC BLOOD PRESSURE: 86 MMHG | WEIGHT: 156 LBS | HEIGHT: 64 IN | SYSTOLIC BLOOD PRESSURE: 140 MMHG

## 2025-06-10 DIAGNOSIS — N65.1 BREAST ASYMMETRY FOLLOWING RECONSTRUCTIVE SURGERY: ICD-10-CM

## 2025-06-10 DIAGNOSIS — T85.44XD CAPSULAR CONTRACTURE OF BREAST IMPLANT, SUBSEQUENT ENCOUNTER: ICD-10-CM

## 2025-06-10 DIAGNOSIS — T85.44XS CAPSULAR CONTRACTURE OF BREAST IMPLANT, SEQUELA: Primary | ICD-10-CM

## 2025-06-10 PROCEDURE — 99024 POSTOP FOLLOW-UP VISIT: CPT | Performed by: SURGERY

## 2025-06-10 PROCEDURE — 3008F BODY MASS INDEX DOCD: CPT | Performed by: SURGERY

## 2025-06-10 RX ORDER — MONTELUKAST SODIUM 10 MG/1
10 TABLET ORAL DAILY
Qty: 30 TABLET | Refills: 0 | Status: SHIPPED | OUTPATIENT
Start: 2025-06-10 | End: 2025-07-10

## 2025-06-23 NOTE — PROGRESS NOTES
"       PLASTIC SURGERY CLINIC VISIT  POSTOP BREAST RECONSTRUCTION     Date: 6/24/25  Date of Surgery: 5/1/25  Surgical Procedure: Bilateral Breast Reconstruction Revision with Implants and Fat Grafting         HPI:   Julieta Lagunas 59 y.o. female is here for post-operative appointment for the above procedure(s).      Interval changes as of this date:   5/6 Doing well overall. ROSARIO drain site oozing over the weekend. Here today for drain evaluation. She reports doing overall well. She denies being active. She reports minimal output from the drain, but it is leaking around the site rather than through the drain. She has been actively managing the drain manually.  Patient is experiencing swelling, particularly on the left breast, and reports heaviness in certain areas.  5/13 Doing well, pain is well controlled. Endorses adequate activity restrictions and protein intake. She reports doing overall well. She reports minimal output of 20-25 cc from the drain which she has been managing manually  5/27 Patient is doing well today. She continues to have drainage from her breast at this time. We will plan to drain the breast today with aspiration of the right breast.   6/3 Doing well overall. Here today to assess for seroma of right breast. Has been compliant with ace wrap.   6/10 Doing well overall. Here for seroma evaluation. Endorses compliance with ace wrap   6/24 Doing well overall. Tolerating Singular well      MEDICATIONS  Current Medications[1]      OBJECTIVE [x]Expand by Default  Blood pressure 126/81, pulse 76, height 1.626 m (5' 4\"), weight 70.8 kg (156 lb).     REVIEW OF SYSTEMS:    Constitutional: negative for fevers, chills, unintentional weight loss  HEENT: negative for changes in vision, headaches, changes in hearing, congestion, sore throat  Cardiovascular: negative for chest pain, palpitations  Respiratory: negative for cough, shortness of breath  Gastrointestinal: negative for nausea, vomiting, " diarrhea  Genitourinary: negative for dysuria, hematuria  Musculoskeletal: negative for joint swelling or pain  Skin: negative for rashes or lesions  Psych: negative for depression, anxiety  Endocrine: negative for polyuria, polydipsia, cold/heat intolerance  Hem/Lymph: negative for bleeding disorder     PHYSICAL EXAM  General: alert and oriented, no apparent distress    Focused exam of the breasts:  Right: Incisions C/D/I, healing well. Minimal edema.   No fluid wave.  R breast is mobile, firm, not as high up, edema in the medial pole  Left: Incisions C/D/I, healing well. Flap viable and well perfused. Minimal edema. good take of fat graft in medial pole. She has some hollowing of up top on of the Left chest to the flap     ASSESSMENT/PLAN  Julieta Lagunas 59 y.o. female who had Bilateral Breast Reconstruction Revision with Implants and Fat Grafting       Photos obtained 6/17/25    Doing well post operatively, pain well controlled. Endorses adequate protein intake. Following activity restrictions. Incisions healing well.        Modified bra and foam to help with support  Plan for fat grafting, possible revision about 12 weeks after healing from her hernia repair, which may or may not be in a month  RTC in 8/2025, hopefully she will have had her hernia repaired by then.       Scribe Attestation  By signing my name below, Jerome HENLEY Scribe, attest that this documentation has been prepared under the direction and in the presence of Liz Chiu MD. Verbal consent obtained from the patient.      Attending Attestation:  Liz HENLEY MD, personal performed the history, exam, and decision making on this patient.        [1]   Current Outpatient Medications:     aspirin 81 mg EC tablet, Take by mouth., Disp: , Rfl:     calcium carbonate-vitamin D3 1,000 mg-20 mcg (800 unit) tablet, Take by mouth once daily., Disp: , Rfl:     diazePAM (Valium) 5 mg tablet, Take 1 tablet (5 mg) by mouth 1 time for 1 dose., Disp: 1  tablet, Rfl: 0    gabapentin (Neurontin) 300 mg capsule, Take 1 capsule (300 mg) by mouth every 8 hours for 14 days., Disp: 42 capsule, Rfl: 0    hydroCHLOROthiazide (HYDRODiuril) 25 mg tablet, Take 1 tablet (25 mg) by mouth once daily., Disp: 90 tablet, Rfl: 0    losartan (Cozaar) 25 mg tablet, Take by mouth. (Patient taking differently: Take 1 tablet (25 mg) by mouth once daily.), Disp: , Rfl:     metoprolol succinate XL (Toprol-XL) 50 mg 24 hr tablet, Take 1 tablet (50 mg) by mouth once daily., Disp: 90 tablet, Rfl: 0    mirabegron (Myrbetriq) 50 mg tablet extended release 24 hr 24 hr tablet, Take 1 tablet (50 mg) by mouth once daily., Disp: , Rfl:     montelukast (Singulair) 10 mg tablet, Take 1 tablet (10 mg) by mouth once daily., Disp: 30 tablet, Rfl: 0    multivitamin (Multiple Vitamins) tablet, Take by mouth., Disp: , Rfl:     omeprazole (PriLOSEC) 20 mg DR capsule, Take 1 capsule (20 mg) by mouth 2 times a day., Disp: , Rfl:     ondansetron (Zofran) 4 mg tablet, Take 1 tablet (4 mg) by mouth every 8 hours if needed for nausea or vomiting., Disp: 10 tablet, Rfl: 0    tolterodine LA (Detrol LA) 2 mg 24 hr capsule, TAKE 1 CAPSULE(2 MG) BY MOUTH DAILY. DO NOT CRUSH, CHEW, OR SPLIT, Disp: 90 capsule, Rfl: 3    venlafaxine XR (Effexor-XR) 75 mg 24 hr capsule, TAKE 1 CAPSULE BY MOUTH ONCE DAILY, Disp: 90 capsule, Rfl: 3    zolpidem (Ambien) 10 mg tablet, Take by mouth., Disp: , Rfl:

## 2025-06-24 ENCOUNTER — APPOINTMENT (OUTPATIENT)
Dept: PLASTIC SURGERY | Facility: CLINIC | Age: 60
End: 2025-06-24
Payer: COMMERCIAL

## 2025-06-24 VITALS
HEART RATE: 76 BPM | SYSTOLIC BLOOD PRESSURE: 126 MMHG | BODY MASS INDEX: 26.63 KG/M2 | DIASTOLIC BLOOD PRESSURE: 81 MMHG | HEIGHT: 64 IN | WEIGHT: 156 LBS

## 2025-06-24 DIAGNOSIS — N65.1 BREAST ASYMMETRY BETWEEN NATIVE BREAST AND RECONSTRUCTED BREAST: ICD-10-CM

## 2025-06-24 DIAGNOSIS — T85.44XD CAPSULAR CONTRACTURE OF BREAST IMPLANT, SUBSEQUENT ENCOUNTER: Primary | ICD-10-CM

## 2025-06-24 PROCEDURE — 99211 OFF/OP EST MAY X REQ PHY/QHP: CPT | Performed by: SURGERY

## 2025-06-24 PROCEDURE — 3008F BODY MASS INDEX DOCD: CPT | Performed by: SURGERY

## 2025-07-10 ENCOUNTER — APPOINTMENT (OUTPATIENT)
Dept: PRIMARY CARE | Facility: CLINIC | Age: 60
End: 2025-07-10
Payer: COMMERCIAL

## 2025-07-10 VITALS
TEMPERATURE: 96.6 F | BODY MASS INDEX: 25.83 KG/M2 | HEIGHT: 65 IN | DIASTOLIC BLOOD PRESSURE: 94 MMHG | HEART RATE: 68 BPM | WEIGHT: 155 LBS | SYSTOLIC BLOOD PRESSURE: 132 MMHG | OXYGEN SATURATION: 100 %

## 2025-07-10 DIAGNOSIS — I10 BENIGN ESSENTIAL HYPERTENSION: ICD-10-CM

## 2025-07-10 DIAGNOSIS — Z23 NEED FOR TDAP VACCINATION: ICD-10-CM

## 2025-07-10 DIAGNOSIS — Z00.00 WELL ADULT EXAM: Primary | ICD-10-CM

## 2025-07-10 DIAGNOSIS — Z85.3 HISTORY OF MALIGNANT NEOPLASM OF BOTH BREASTS: ICD-10-CM

## 2025-07-10 DIAGNOSIS — K21.9 GASTROESOPHAGEAL REFLUX DISEASE WITHOUT ESOPHAGITIS: ICD-10-CM

## 2025-07-10 DIAGNOSIS — E78.2 MIXED HYPERLIPIDEMIA: ICD-10-CM

## 2025-07-10 PROBLEM — G47.9 SLEEP DISTURBANCE: Status: ACTIVE | Noted: 2025-07-10

## 2025-07-10 LAB
POC APPEARANCE, URINE: CLEAR
POC BILIRUBIN, URINE: NEGATIVE
POC BLOOD, URINE: NEGATIVE
POC COLOR, URINE: YELLOW
POC GLUCOSE, URINE: NEGATIVE MG/DL
POC KETONES, URINE: NEGATIVE MG/DL
POC LEUKOCYTES, URINE: NEGATIVE
POC NITRITE,URINE: NEGATIVE
POC PH, URINE: 5.5 PH
POC PROTEIN, URINE: NEGATIVE MG/DL
POC SPECIFIC GRAVITY, URINE: 1.02
POC UROBILINOGEN, URINE: 0.2 EU/DL

## 2025-07-10 PROCEDURE — 99386 PREV VISIT NEW AGE 40-64: CPT

## 2025-07-10 PROCEDURE — 99213 OFFICE O/P EST LOW 20 MIN: CPT

## 2025-07-10 PROCEDURE — 81003 URINALYSIS AUTO W/O SCOPE: CPT

## 2025-07-10 PROCEDURE — 3080F DIAST BP >= 90 MM HG: CPT

## 2025-07-10 PROCEDURE — 93000 ELECTROCARDIOGRAM COMPLETE: CPT

## 2025-07-10 PROCEDURE — 3075F SYST BP GE 130 - 139MM HG: CPT

## 2025-07-10 PROCEDURE — 3008F BODY MASS INDEX DOCD: CPT

## 2025-07-10 PROCEDURE — 90471 IMMUNIZATION ADMIN: CPT

## 2025-07-10 PROCEDURE — 90715 TDAP VACCINE 7 YRS/> IM: CPT

## 2025-07-10 RX ORDER — LOSARTAN POTASSIUM 25 MG/1
25 TABLET ORAL DAILY
Qty: 90 TABLET | Refills: 0 | Status: SHIPPED | OUTPATIENT
Start: 2025-07-10

## 2025-07-10 ASSESSMENT — ENCOUNTER SYMPTOMS
CHILLS: 0
SHORTNESS OF BREATH: 0
UNEXPECTED WEIGHT CHANGE: 0
NAUSEA: 0
CONSTIPATION: 0
PALPITATIONS: 0
FREQUENCY: 0
DIARRHEA: 0
ABDOMINAL PAIN: 0
DEPRESSION: 0
VOMITING: 0
LOSS OF SENSATION IN FEET: 0
FEVER: 0
OCCASIONAL FEELINGS OF UNSTEADINESS: 0

## 2025-07-10 ASSESSMENT — PATIENT HEALTH QUESTIONNAIRE - PHQ9
2. FEELING DOWN, DEPRESSED OR HOPELESS: NOT AT ALL
1. LITTLE INTEREST OR PLEASURE IN DOING THINGS: NOT AT ALL
SUM OF ALL RESPONSES TO PHQ9 QUESTIONS 1 AND 2: 0

## 2025-07-10 ASSESSMENT — PAIN SCALES - GENERAL: PAINLEVEL_OUTOF10: 0-NO PAIN

## 2025-07-10 NOTE — PROGRESS NOTES
Subjective   Patient ID: Julieta Lagunas is a 59 y.o. female who presents for Annual Exam (Pt has concerns for elevated bp.).    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 Still has all her reproductive organs   Sexual Activity: Yes, No concerns     Chronic Conditions:  HTN: metoprolol 50, hydrochlorothiazide 25 - has been getting higher readings lately >140/90 consistently.   HLD: Had high triglycerides 3 years ago   Breast CA: diagnosed with stage 3b in 2015  GERD: takes omeprazole daily if needed due to radiation   Spiglean hernia: Dr Lyman (gen surg), getting surgical repair - hasn't scheduled it yet     Preventive:   Mammo: Does not get mammos since 2013   Colonoscopy: 11/8/2018 with 10 year follow up   PAP: Done 3/30/22 showing LSIL and neg HPV  Immunizations: Zoster, prevnar, TDAP due      Patient Care Team:  Aaliyah Hyman PA-C as PCP - General (Internal Medicine)  ISAIAH Garrett as PCP - Employee [employer]CO PCP    Julieta Lagunas is seen for comprehensive physical exam.  PMH, PSH, family history and social history were reviewed and updated.    Review of Systems   Constitutional:  Negative for chills, fever and unexpected weight change.   HENT:  Negative for congestion and hearing loss.    Eyes:  Negative for visual disturbance.   Respiratory:  Negative for shortness of breath.    Cardiovascular:  Negative for chest pain, palpitations and leg swelling.   Gastrointestinal:  Negative for abdominal pain, constipation, diarrhea, nausea and vomiting.   Genitourinary:  Negative for frequency, menstrual problem and urgency.   All other systems reviewed and are  "negative.      Objective   BP (!) 132/94   Pulse 68   Temp 35.9 °C (96.6 °F) (Temporal)   Ht 1.638 m (5' 4.5\")   Wt 70.3 kg (155 lb)   LMP 06/01/2013 (Approximate)   SpO2 100%   BMI 26.19 kg/m²     Physical Exam  Vitals and nursing note reviewed.   Constitutional:       General: She is not in acute distress.     Appearance: Normal appearance. She is not ill-appearing.     Eyes:      Extraocular Movements: Extraocular movements intact.      Conjunctiva/sclera: Conjunctivae normal.       Cardiovascular:      Rate and Rhythm: Normal rate and regular rhythm.      Pulses: Normal pulses.      Heart sounds: Normal heart sounds. No murmur heard.     No friction rub. No gallop.   Pulmonary:      Effort: Pulmonary effort is normal. No respiratory distress.      Breath sounds: Normal breath sounds. No stridor. No wheezing, rhonchi or rales.   Chest:      Chest wall: No tenderness.   Abdominal:      General: Abdomen is flat. There is no distension.      Palpations: Abdomen is soft. There is no mass.      Tenderness: There is no abdominal tenderness.      Hernia: No hernia is present.     Musculoskeletal:      Cervical back: Normal range of motion and neck supple.     Skin:     General: Skin is warm and dry.      Coloration: Skin is not jaundiced.     Neurological:      Mental Status: She is alert and oriented to person, place, and time.     Psychiatric:         Mood and Affect: Mood normal.         Behavior: Behavior normal.         Assessment/Plan   Assessment & Plan  Well adult exam    Orders:    POCT UA Automated manually resulted    ECG 12 lead (Clinic Performed)    Comprehensive metabolic panel; Future    CBC and Auto Differential; Future    Lipid panel; Future    Well adult exam  1. Age appropriate preventative measures reviewed.   2. Encouraged healthy diet and exercise.  3. Immunizations reviewed  4. Labs ordered  5. Medications - Reviewed    History of malignant neoplasm of both breasts         Sees hem/onc "   Currently working with reconstruction team   Mixed hyperlipidemia       Last lipids in 2022 shoed elevated triglycerides and VLDL. Will recheck.   Benign essential hypertension    Orders:    ECG 12 lead (Clinic Performed)    losartan (Cozaar) 25 mg tablet; Take 1 tablet (25 mg) by mouth once daily.    Half metoprolol (25)  Start losartan 25 mg   Continue hydrochlorothiazide 25 mg     Will add on losartan to help manage BP - decrease metoprolol.     Monitor BP at home for the next week   Follow up in 1 month      Call if blood pressure consistently >140/90.  Non pharmacological interventions such as lowering salt, saturated fats, cholesterol, and sugar diet discussed.  Increase physical activity, aim for 30 minutes 5 days per week as able.  Stress reduction interventions discussed.  Discussed signs and symptoms of major cardiovascular event and need to present to the ED.   Need for Tdap vaccination    Orders:    Tdap vaccine, age 7 years and older  (BOOSTRIX)    Given today     Follow up 1 Month, sooner with any problems or concerns.

## 2025-07-10 NOTE — ASSESSMENT & PLAN NOTE
Orders:    ECG 12 lead (Clinic Performed)    losartan (Cozaar) 25 mg tablet; Take 1 tablet (25 mg) by mouth once daily.    Half metoprolol (25)  Start losartan 25 mg   Continue hydrochlorothiazide 25 mg     Will add on losartan to help manage BP - decrease metoprolol.     Monitor BP at home for the next week   Follow up in 1 month      Call if blood pressure consistently >140/90.  Non pharmacological interventions such as lowering salt, saturated fats, cholesterol, and sugar diet discussed.  Increase physical activity, aim for 30 minutes 5 days per week as able.  Stress reduction interventions discussed.  Discussed signs and symptoms of major cardiovascular event and need to present to the ED.

## 2025-07-23 DIAGNOSIS — Z92.21 HISTORY OF ANTINEOPLASTIC CHEMOTHERAPY: ICD-10-CM

## 2025-07-23 DIAGNOSIS — Z85.3 HISTORY OF MALIGNANT NEOPLASM OF BOTH BREASTS: Primary | ICD-10-CM

## 2025-07-23 DIAGNOSIS — G62.0 CHEMOTHERAPY-INDUCED NEUROPATHY (MULTI): ICD-10-CM

## 2025-07-23 DIAGNOSIS — T45.1X5A CHEMOTHERAPY-INDUCED NEUROPATHY (MULTI): ICD-10-CM

## 2025-07-23 RX ORDER — GABAPENTIN 300 MG/1
300 CAPSULE ORAL 2 TIMES DAILY
Qty: 180 CAPSULE | Refills: 3 | Status: SHIPPED | OUTPATIENT
Start: 2025-07-23 | End: 2026-07-23

## 2025-08-11 DIAGNOSIS — I10 BENIGN ESSENTIAL HYPERTENSION: ICD-10-CM

## 2025-08-12 RX ORDER — METOPROLOL SUCCINATE 50 MG/1
50 TABLET, EXTENDED RELEASE ORAL DAILY
Qty: 90 TABLET | Refills: 0 | OUTPATIENT
Start: 2025-08-12

## 2025-08-14 ENCOUNTER — APPOINTMENT (OUTPATIENT)
Dept: PRIMARY CARE | Facility: CLINIC | Age: 60
End: 2025-08-14
Payer: COMMERCIAL

## 2025-08-14 ENCOUNTER — LAB (OUTPATIENT)
Dept: LAB | Facility: HOSPITAL | Age: 60
End: 2025-08-14
Payer: COMMERCIAL

## 2025-08-15 LAB
ALBUMIN SERPL-MCNC: 4.6 G/DL (ref 3.6–5.1)
ALP SERPL-CCNC: 47 U/L (ref 37–153)
ALT SERPL-CCNC: 25 U/L (ref 6–29)
ANION GAP SERPL CALCULATED.4IONS-SCNC: 13 MMOL/L (CALC) (ref 7–17)
AST SERPL-CCNC: 24 U/L (ref 10–35)
BASOPHILS # BLD AUTO: 42 CELLS/UL (ref 0–200)
BASOPHILS NFR BLD AUTO: 0.8 %
BILIRUB SERPL-MCNC: 0.4 MG/DL (ref 0.2–1.2)
BUN SERPL-MCNC: 15 MG/DL (ref 7–25)
CALCIUM SERPL-MCNC: 10.2 MG/DL (ref 8.6–10.4)
CHLORIDE SERPL-SCNC: 98 MMOL/L (ref 98–110)
CHOLEST SERPL-MCNC: 281 MG/DL
CHOLEST/HDLC SERPL: 3.4 (CALC)
CO2 SERPL-SCNC: 28 MMOL/L (ref 20–32)
COTININE UR QL SCN: NEGATIVE
CREAT SERPL-MCNC: 0.81 MG/DL (ref 0.5–1.03)
EGFRCR SERPLBLD CKD-EPI 2021: 84 ML/MIN/1.73M2
EOSINOPHIL # BLD AUTO: 99 CELLS/UL (ref 15–500)
EOSINOPHIL NFR BLD AUTO: 1.9 %
ERYTHROCYTE [DISTWIDTH] IN BLOOD BY AUTOMATED COUNT: 12.5 % (ref 11–15)
GLUCOSE SERPL-MCNC: 119 MG/DL (ref 65–139)
HCT VFR BLD AUTO: 41 % (ref 35–45)
HDLC SERPL-MCNC: 83 MG/DL
HGB BLD-MCNC: 13.5 G/DL (ref 11.7–15.5)
LDLC SERPL CALC-MCNC: 160 MG/DL (CALC)
LYMPHOCYTES # BLD AUTO: 2194 CELLS/UL (ref 850–3900)
LYMPHOCYTES NFR BLD AUTO: 42.2 %
MCH RBC QN AUTO: 30.9 PG (ref 27–33)
MCHC RBC AUTO-ENTMCNC: 32.9 G/DL (ref 32–36)
MCV RBC AUTO: 93.8 FL (ref 80–100)
MONOCYTES # BLD AUTO: 400 CELLS/UL (ref 200–950)
MONOCYTES NFR BLD AUTO: 7.7 %
NEUTROPHILS # BLD AUTO: 2465 CELLS/UL (ref 1500–7800)
NEUTROPHILS NFR BLD AUTO: 47.4 %
NONHDLC SERPL-MCNC: 198 MG/DL (CALC)
PLATELET # BLD AUTO: 291 THOUSAND/UL (ref 140–400)
PMV BLD REES-ECKER: 10.2 FL (ref 7.5–12.5)
POTASSIUM SERPL-SCNC: 3.3 MMOL/L (ref 3.5–5.3)
PROT SERPL-MCNC: 7.3 G/DL (ref 6.1–8.1)
RBC # BLD AUTO: 4.37 MILLION/UL (ref 3.8–5.1)
SODIUM SERPL-SCNC: 139 MMOL/L (ref 135–146)
TRIGL SERPL-MCNC: 211 MG/DL
WBC # BLD AUTO: 5.2 THOUSAND/UL (ref 3.8–10.8)

## 2025-08-19 DIAGNOSIS — Z12.31 ENCOUNTER FOR SCREENING MAMMOGRAM FOR BREAST CANCER: ICD-10-CM

## 2025-08-22 ENCOUNTER — APPOINTMENT (OUTPATIENT)
Dept: PRIMARY CARE | Facility: CLINIC | Age: 60
End: 2025-08-22
Payer: COMMERCIAL

## 2025-08-26 ENCOUNTER — APPOINTMENT (OUTPATIENT)
Dept: PLASTIC SURGERY | Facility: CLINIC | Age: 60
End: 2025-08-26
Payer: COMMERCIAL

## 2025-08-26 ENCOUNTER — OFFICE VISIT (OUTPATIENT)
Dept: PRIMARY CARE | Facility: CLINIC | Age: 60
End: 2025-08-26
Payer: COMMERCIAL

## 2025-08-26 ENCOUNTER — TELEPHONE (OUTPATIENT)
Dept: PRIMARY CARE | Facility: CLINIC | Age: 60
End: 2025-08-26

## 2025-08-26 VITALS
HEART RATE: 60 BPM | OXYGEN SATURATION: 98 % | WEIGHT: 157 LBS | BODY MASS INDEX: 26.53 KG/M2 | TEMPERATURE: 97.2 F | SYSTOLIC BLOOD PRESSURE: 123 MMHG | DIASTOLIC BLOOD PRESSURE: 80 MMHG

## 2025-08-26 DIAGNOSIS — I10 BENIGN ESSENTIAL HYPERTENSION: ICD-10-CM

## 2025-08-26 DIAGNOSIS — E78.2 MIXED HYPERLIPIDEMIA: Primary | ICD-10-CM

## 2025-08-26 LAB
POC APPEARANCE, URINE: CLEAR
POC BILIRUBIN, URINE: NEGATIVE
POC BLOOD, URINE: ABNORMAL
POC COLOR, URINE: YELLOW
POC GLUCOSE, URINE: NEGATIVE MG/DL
POC KETONES, URINE: NEGATIVE MG/DL
POC LEUKOCYTES, URINE: NEGATIVE
POC NITRITE,URINE: NEGATIVE
POC PH, URINE: 5.5 PH
POC PROTEIN, URINE: NEGATIVE MG/DL
POC SPECIFIC GRAVITY, URINE: >=1.03
POC UROBILINOGEN, URINE: 0.2 EU/DL

## 2025-08-26 PROCEDURE — 3079F DIAST BP 80-89 MM HG: CPT

## 2025-08-26 PROCEDURE — 81003 URINALYSIS AUTO W/O SCOPE: CPT

## 2025-08-26 PROCEDURE — 99213 OFFICE O/P EST LOW 20 MIN: CPT

## 2025-08-26 PROCEDURE — 3074F SYST BP LT 130 MM HG: CPT

## 2025-08-26 RX ORDER — LOSARTAN POTASSIUM 25 MG/1
25 TABLET ORAL DAILY
Qty: 90 TABLET | Refills: 0 | Status: SHIPPED | OUTPATIENT
Start: 2025-08-26

## 2025-08-26 RX ORDER — HYDROCHLOROTHIAZIDE 25 MG/1
25 TABLET ORAL DAILY
Qty: 90 TABLET | Refills: 0 | Status: SHIPPED | OUTPATIENT
Start: 2025-08-26

## 2025-08-26 ASSESSMENT — PAIN SCALES - GENERAL: PAINLEVEL_OUTOF10: 0-NO PAIN

## 2025-08-26 ASSESSMENT — PATIENT HEALTH QUESTIONNAIRE - PHQ9
SUM OF ALL RESPONSES TO PHQ9 QUESTIONS 1 AND 2: 0
1. LITTLE INTEREST OR PLEASURE IN DOING THINGS: NOT AT ALL
2. FEELING DOWN, DEPRESSED OR HOPELESS: NOT AT ALL

## 2025-08-28 ENCOUNTER — APPOINTMENT (OUTPATIENT)
Dept: DERMATOLOGY | Facility: CLINIC | Age: 60
End: 2025-08-28
Payer: COMMERCIAL

## 2025-09-10 ENCOUNTER — APPOINTMENT (OUTPATIENT)
Dept: PRIMARY CARE | Facility: CLINIC | Age: 60
End: 2025-09-10
Payer: COMMERCIAL

## 2025-10-20 ENCOUNTER — APPOINTMENT (OUTPATIENT)
Dept: OPHTHALMOLOGY | Facility: CLINIC | Age: 60
End: 2025-10-20
Payer: COMMERCIAL

## 2025-10-29 ENCOUNTER — APPOINTMENT (OUTPATIENT)
Dept: DERMATOLOGY | Facility: CLINIC | Age: 60
End: 2025-10-29
Payer: COMMERCIAL

## 2025-11-25 ENCOUNTER — APPOINTMENT (OUTPATIENT)
Dept: PRIMARY CARE | Facility: CLINIC | Age: 60
End: 2025-11-25
Payer: COMMERCIAL

## (undated) DEVICE — DRESSING, GAUZE, PETROLATUM, XEROFORM, 5 X 9 IN, STERILE

## (undated) DEVICE — SYRINGE, 10 CC, LUER LOCK

## (undated) DEVICE — SUTURE, MONOCRYL, 4-0, 27 IN, PS-2, UNDYED

## (undated) DEVICE — BAG, DECANTER

## (undated) DEVICE — DRAPE, INCISE, ANTIMICROBIAL, IOBAN 2, LARGE, 17 X 23 IN, DISPOSABLE, STERILE

## (undated) DEVICE — TUBING, ASPIRATION, LIPOSUCTION

## (undated) DEVICE — SUTURE, PROLENE, 0, 30 IN, CT-2, BLUE

## (undated) DEVICE — TUBING SET, ASPIRATION

## (undated) DEVICE — DRESSING, TRANSPARENT, TEGADERM, 4 X 4-3/4 IN, NO LABEL

## (undated) DEVICE — ADHESIVE WOUND CLOSURE

## (undated) DEVICE — SUTURE, MONOCRYL, 4-0, 18 IN, PS2, UNDYED

## (undated) DEVICE — Device

## (undated) DEVICE — SYRINGE, 60 CC, LUER LOCK, MONOJECT

## (undated) DEVICE — CRADLE, ARM, FOAM

## (undated) DEVICE — 10FT MICROAIRE-TYPE HIGH FLOW TAPERED ASPIRATION TUBING

## (undated) DEVICE — APPLICATOR, CHLORAPREP, W/ORANGE TINT, 26ML

## (undated) DEVICE — NEEDLE, HYPODERMIC, 25 G X 2 IN

## (undated) DEVICE — WOUND CARE, MEPITEL ONE, 3 X 4, 7.5 X 10CM

## (undated) DEVICE — SUTURE, MONOCRYL, 3-0, 27 IN, PS-2, UNDYED

## (undated) DEVICE — 30MM X 135MM ONETRAC LX CORDLESS RETRACTOR, W/INTEGRATED MULTI-LED

## (undated) DEVICE — SUTURE, VICRYL 2-0, TAPER POINT, CT-1 UNDYED 27 INCH

## (undated) DEVICE — SYRINGE, 60 CC, IRRIGATION, PISTON, CATH TIP, W/LUER ADAPTER,DISP

## (undated) DEVICE — DRESSING, GAUZE, PETROLATUM, PATCH, XEROFORM, 1 X 8 IN, STERILE

## (undated) DEVICE — DRESSING, GAUZE, SUPER KERLIX, 6X6

## (undated) DEVICE — DRESSING, ABDOMINAL, TENDERSORB, 8 X 7-1/2 IN, STERILE

## (undated) DEVICE — STOPCOCK, 3 WAY, FEMALE/MALE LUER LOCK

## (undated) DEVICE — 13FT VITRUVIAN SOFTOUCH PUMP TUBING

## (undated) DEVICE — ELECTRODE, ELECTROSURGICAL, BLADE EXT 4 INCH, INSULATED

## (undated) DEVICE — SUTURE, MONOCRYL PLUS, 3-0 1X27INCH, PS-2 UNDYED

## (undated) DEVICE — PUREGRAFT 850 SUCTION LIPOPLASTY SYSTEM, 1 PUREGRAFT 850 BAG, 1 PUREGRAFT 850 DRAIN BAG, 12 TISSUE ACCESS PORT ADAPTER (TAPA), 2 LUER LOCK SYRINGE ADAPTER

## (undated) DEVICE — SUTURE, ETHILON, 2-0, 18 IN, FS, BLACK, BX/12

## (undated) DEVICE — DRESSING, GAUZE, SPONGE, 12 PLY, CURITY, 4 X 4 IN, RIGID TRAY, STERILE

## (undated) DEVICE — STRIP, SKIN CLOSURE, STERI STRIP, REINFORCED, 0.5 X 4 IN

## (undated) DEVICE — SPONGE, LAP, XRAY DECT, 18IN X 18IN, W/LOOP, STERILE

## (undated) DEVICE — SYRINGE, 60 CC, IRRIGATION, BULB, CONTRO-BULB, PAPER POUCH